# Patient Record
Sex: FEMALE | Race: ASIAN
[De-identification: names, ages, dates, MRNs, and addresses within clinical notes are randomized per-mention and may not be internally consistent; named-entity substitution may affect disease eponyms.]

---

## 2019-07-09 ENCOUNTER — HOSPITAL ENCOUNTER (EMERGENCY)
Dept: HOSPITAL 95 - ER | Age: 34
Discharge: HOME | End: 2019-07-09
Payer: COMMERCIAL

## 2019-07-09 VITALS — WEIGHT: 103.99 LBS | BODY MASS INDEX: 17.75 KG/M2 | HEIGHT: 64 IN

## 2019-07-09 DIAGNOSIS — D61.818: Primary | ICD-10-CM

## 2019-07-09 DIAGNOSIS — Z91.048: ICD-10-CM

## 2019-07-09 DIAGNOSIS — Z79.899: ICD-10-CM

## 2019-07-09 DIAGNOSIS — Z88.8: ICD-10-CM

## 2019-07-09 DIAGNOSIS — C95.90: ICD-10-CM

## 2019-07-09 LAB
ALBUMIN SERPL BCP-MCNC: 3.8 G/DL (ref 3.4–5)
ALBUMIN/GLOB SERPL: 1 {RATIO} (ref 0.8–1.8)
ALT SERPL W P-5'-P-CCNC: 27 U/L (ref 12–78)
ANION GAP SERPL CALCULATED.4IONS-SCNC: 5 MMOL/L (ref 6–16)
AST SERPL W P-5'-P-CCNC: 16 U/L (ref 12–37)
BILIRUB SERPL-MCNC: 0.3 MG/DL (ref 0.1–1)
BUN SERPL-MCNC: 15 MG/DL (ref 8–24)
CALCIUM SERPL-MCNC: 9.6 MG/DL (ref 8.5–10.1)
CHLORIDE SERPL-SCNC: 106 MMOL/L (ref 98–108)
CO2 SERPL-SCNC: 33 MMOL/L (ref 21–32)
CREAT SERPL-MCNC: 0.65 MG/DL (ref 0.4–1)
FIBRINOGEN PPP-MCNC: 396 MG/DL (ref 170–430)
GLOBULIN SER CALC-MCNC: 3.7 G/DL (ref 2.2–4)
GLUCOSE SERPL-MCNC: 101 MG/DL (ref 70–99)
POTASSIUM SERPL-SCNC: 3.9 MMOL/L (ref 3.5–5.5)
PROT SERPL-MCNC: 7.5 G/DL (ref 6.4–8.2)
PROTHROMBIN TIME: 10.2 SEC (ref 9.7–11.5)
SODIUM SERPL-SCNC: 144 MMOL/L (ref 136–145)

## 2019-07-09 PROCEDURE — P9035 PLATELET PHERES LEUKOREDUCED: HCPCS

## 2019-07-12 ENCOUNTER — HOSPITAL ENCOUNTER (EMERGENCY)
Dept: HOSPITAL 95 - ER | Age: 34
Discharge: HOME | End: 2019-07-12
Payer: COMMERCIAL

## 2019-07-12 VITALS — BODY MASS INDEX: 17.52 KG/M2 | HEIGHT: 64 IN | WEIGHT: 102.6 LBS

## 2019-07-12 DIAGNOSIS — G89.29: Primary | ICD-10-CM

## 2019-07-12 DIAGNOSIS — Z79.899: ICD-10-CM

## 2019-07-12 DIAGNOSIS — M54.9: ICD-10-CM

## 2019-07-12 DIAGNOSIS — C95.90: ICD-10-CM

## 2019-07-12 DIAGNOSIS — Z88.8: ICD-10-CM

## 2019-07-15 ENCOUNTER — HOSPITAL ENCOUNTER (OUTPATIENT)
Dept: HOSPITAL 95 - ATC | Age: 34
Discharge: HOME | End: 2019-07-15
Attending: INTERNAL MEDICINE
Payer: COMMERCIAL

## 2019-07-15 DIAGNOSIS — C92.02: Primary | ICD-10-CM

## 2019-07-15 LAB
BASOPHILS # BLD: 0 K/MM3 (ref 0–0.23)
BASOPHILS NFR BLD: 0 % (ref 0–2)
BLASTS NFR BLD MANUAL: 22 % (ref 0–0)
DEPRECATED RDW RBC AUTO: 51.7 FL (ref 35.1–46.3)
EOSINOPHIL # BLD: 0 K/MM3 (ref 0–0.68)
EOSINOPHIL NFR BLD: 0 % (ref 0–6)
ERYTHROCYTE [DISTWIDTH] IN BLOOD BY AUTOMATED COUNT: 14.7 % (ref 11.7–14.2)
HCT VFR BLD AUTO: 28.2 % (ref 33–51)
HGB BLD-MCNC: 9.1 G/DL (ref 11.5–16)
LYMPHOCYTES # BLD: 4.06 K/MM3 (ref 0.84–5.2)
LYMPHOCYTES NFR BLD: 70 % (ref 21–46)
MCHC RBC AUTO-ENTMCNC: 32.3 G/DL (ref 31.5–36.5)
MCV RBC AUTO: 97 FL (ref 80–100)
MONOCYTES # BLD: 0.11 K/MM3 (ref 0.16–1.47)
MONOCYTES NFR BLD: 2 % (ref 4–13)
NEUTS SEG # BLD MANUAL: 0.34 K/MM3 (ref 1.96–9.15)
NEUTS SEG NFR BLD MANUAL: 6 % (ref 41–73)
NRBC # BLD AUTO: 0 K/MM3 (ref 0–0.02)
NRBC BLD AUTO-RTO: 0 /100 WBC (ref 0–0.2)
PLATELET # BLD AUTO: 6 K/MM3 (ref 150–400)
TOTAL CELLS COUNTED BLD: 100

## 2019-07-15 PROCEDURE — P9035 PLATELET PHERES LEUKOREDUCED: HCPCS

## 2019-07-15 PROCEDURE — 30233R1 TRANSFUSION OF NONAUTOLOGOUS PLATELETS INTO PERIPHERAL VEIN, PERCUTANEOUS APPROACH: ICD-10-PCS | Performed by: INTERNAL MEDICINE

## 2019-08-01 ENCOUNTER — HOSPITAL ENCOUNTER (INPATIENT)
Dept: HOSPITAL 95 - ER | Age: 34
LOS: 3 days | Discharge: HOME | DRG: 809 | End: 2019-08-04
Attending: INTERNAL MEDICINE | Admitting: INTERNAL MEDICINE
Payer: COMMERCIAL

## 2019-08-01 VITALS — HEIGHT: 64.02 IN | BODY MASS INDEX: 18.22 KG/M2 | WEIGHT: 106.7 LBS
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
## 2019-08-01 DIAGNOSIS — R50.81: ICD-10-CM

## 2019-08-01 DIAGNOSIS — Z94.81: ICD-10-CM

## 2019-08-01 DIAGNOSIS — D70.1: Primary | ICD-10-CM

## 2019-08-01 DIAGNOSIS — Z94.84: ICD-10-CM

## 2019-08-01 DIAGNOSIS — D69.6: ICD-10-CM

## 2019-08-01 DIAGNOSIS — T45.1X5A: ICD-10-CM

## 2019-08-01 DIAGNOSIS — K21.9: ICD-10-CM

## 2019-08-01 DIAGNOSIS — C95.92: ICD-10-CM

## 2019-08-01 LAB
ALBUMIN SERPL BCP-MCNC: 2.9 G/DL (ref 3.4–5)
ALBUMIN/GLOB SERPL: 0.9 {RATIO} (ref 0.8–1.8)
ALT SERPL W P-5'-P-CCNC: 24 U/L (ref 12–78)
ANION GAP SERPL CALCULATED.4IONS-SCNC: 7 MMOL/L (ref 6–16)
AST SERPL W P-5'-P-CCNC: 10 U/L (ref 12–37)
BASOPHILS # BLD AUTO: 0 K/MM3 (ref 0–0.23)
BASOPHILS NFR BLD AUTO: 0 % (ref 0–2)
BILIRUB SERPL-MCNC: 0.5 MG/DL (ref 0.1–1)
BUN SERPL-MCNC: 11 MG/DL (ref 8–24)
CALCIUM SERPL-MCNC: 8.7 MG/DL (ref 8.5–10.1)
CHLORIDE SERPL-SCNC: 104 MMOL/L (ref 98–108)
CO2 SERPL-SCNC: 29 MMOL/L (ref 21–32)
CREAT SERPL-MCNC: 0.64 MG/DL (ref 0.4–1)
DEPRECATED RDW RBC AUTO: 47.8 FL (ref 35.1–46.3)
EOSINOPHIL # BLD AUTO: 0 K/MM3 (ref 0–0.68)
EOSINOPHIL NFR BLD AUTO: 0 % (ref 0–6)
ERYTHROCYTE [DISTWIDTH] IN BLOOD BY AUTOMATED COUNT: 14.9 % (ref 11.7–14.2)
GLOBULIN SER CALC-MCNC: 3.4 G/DL (ref 2.2–4)
GLUCOSE SERPL-MCNC: 161 MG/DL (ref 70–99)
HCT VFR BLD AUTO: 23.1 % (ref 33–51)
HGB BLD-MCNC: 7.8 G/DL (ref 11.5–16)
IMM GRANULOCYTES # BLD AUTO: 0.01 K/MM3 (ref 0–0.1)
IMM GRANULOCYTES NFR BLD AUTO: 6 % (ref 0–1)
LYMPHOCYTES # BLD AUTO: 0.07 K/MM3 (ref 0.84–5.2)
LYMPHOCYTES NFR BLD AUTO: 44 % (ref 21–46)
MCHC RBC AUTO-ENTMCNC: 33.8 G/DL (ref 31.5–36.5)
MCV RBC AUTO: 88 FL (ref 80–100)
MONOCYTES # BLD AUTO: 0.01 K/MM3 (ref 0.16–1.47)
MONOCYTES NFR BLD AUTO: 6 % (ref 4–13)
NEUTROPHILS # BLD AUTO: 0.07 K/MM3 (ref 1.96–9.15)
NEUTROPHILS NFR BLD AUTO: 44 % (ref 41–73)
NRBC # BLD AUTO: 0 K/MM3 (ref 0–0.02)
NRBC BLD AUTO-RTO: 0 /100 WBC (ref 0–0.2)
PLATELET # BLD AUTO: 31 K/MM3 (ref 150–400)
POTASSIUM SERPL-SCNC: 3.5 MMOL/L (ref 3.5–5.5)
PROT SERPL-MCNC: 6.3 G/DL (ref 6.4–8.2)
PROTHROMBIN TIME: 11 SEC (ref 9.7–11.5)
RBC #/AREA URNS HPF: (no result) /HPF (ref 0–2)
SODIUM SERPL-SCNC: 140 MMOL/L (ref 136–145)
SP GR SPEC: 1.01 (ref 1–1.02)
UROBILINOGEN UR STRIP-MCNC: (no result) MG/DL
WBC #/AREA URNS HPF: (no result) /HPF (ref 0–5)

## 2019-08-01 PROCEDURE — P9035 PLATELET PHERES LEUKOREDUCED: HCPCS

## 2019-08-01 PROCEDURE — P9612 CATHETERIZE FOR URINE SPEC: HCPCS

## 2019-08-01 PROCEDURE — P9040 RBC LEUKOREDUCED IRRADIATED: HCPCS

## 2019-08-01 PROCEDURE — P9016 RBC LEUKOCYTES REDUCED: HCPCS

## 2019-08-01 PROCEDURE — A9270 NON-COVERED ITEM OR SERVICE: HCPCS

## 2019-08-01 PROCEDURE — C9113 INJ PANTOPRAZOLE SODIUM, VIA: HCPCS

## 2019-08-01 NOTE — NUR
ADMIT TO FLOOR
ARRIVED TO FLOOR VIA STRETCHER ACCOMPANIED BY SPOUSE.
ORIENTED TO ROOM. CALL LIGHT WITHIN REACH. BED LOW AND IN LOCKED POSITION.

## 2019-08-01 NOTE — NUR
DISCHARGE NOTE
NEW ADMIT THIS P.M. FROM ED. CHEMO 2 DAYS AGO. ORIGINAL DX LEUKEMIA IN 2016 IN
[place]. M[place] STATES TO OBTAIN TREATMENT. NEUTROPENIC PRECAUTIONS. EATING
AND DRINKING WELL. OX4 AND INDEPENDENT IN ROOM. SCHEDULED FOR LABS AND
POSSIBLE TRANSFUSIONS TOMORROW WITH DR. ELLIS. I CALLED DR. RODRIGUEZ OFFICE AND
LEFT VM THAT PATIENT WAS AT [facility]. DENIES ANY PAIN. AWAITING CLINICAL TRIAL AT
[place] IN FIRST PART OF THIS MONTH.

## 2019-08-02 LAB
ANION GAP SERPL CALCULATED.4IONS-SCNC: 3 MMOL/L (ref 6–16)
BASOPHILS # BLD AUTO: 0 K/MM3 (ref 0–0.23)
BASOPHILS NFR BLD AUTO: 0 % (ref 0–2)
BUN SERPL-MCNC: 9 MG/DL (ref 8–24)
CALCIUM SERPL-MCNC: 8 MG/DL (ref 8.5–10.1)
CHLORIDE SERPL-SCNC: 108 MMOL/L (ref 98–108)
CO2 SERPL-SCNC: 28 MMOL/L (ref 21–32)
CREAT SERPL-MCNC: 0.65 MG/DL (ref 0.4–1)
DEPRECATED RDW RBC AUTO: 49.1 FL (ref 35.1–46.3)
EOSINOPHIL # BLD AUTO: 0 K/MM3 (ref 0–0.68)
EOSINOPHIL NFR BLD AUTO: 0 % (ref 0–6)
ERYTHROCYTE [DISTWIDTH] IN BLOOD BY AUTOMATED COUNT: 14.9 % (ref 11.7–14.2)
GLUCOSE SERPL-MCNC: 100 MG/DL (ref 70–99)
HCT VFR BLD AUTO: 19 % (ref 33–51)
HGB BLD-MCNC: 6.3 G/DL (ref 11.5–16)
IMM GRANULOCYTES # BLD AUTO: 0 K/MM3 (ref 0–0.1)
IMM GRANULOCYTES NFR BLD AUTO: 0 % (ref 0–1)
LYMPHOCYTES # BLD AUTO: 0.1 K/MM3 (ref 0.84–5.2)
LYMPHOCYTES NFR BLD AUTO: 44 % (ref 21–46)
MCHC RBC AUTO-ENTMCNC: 33.2 G/DL (ref 31.5–36.5)
MCV RBC AUTO: 90 FL (ref 80–100)
MONOCYTES # BLD AUTO: 0.02 K/MM3 (ref 0.16–1.47)
MONOCYTES NFR BLD AUTO: 9 % (ref 4–13)
NEUTROPHILS # BLD AUTO: 0.11 K/MM3 (ref 1.96–9.15)
NEUTROPHILS NFR BLD AUTO: 48 % (ref 41–73)
NRBC # BLD AUTO: 0 K/MM3 (ref 0–0.02)
NRBC BLD AUTO-RTO: 0 /100 WBC (ref 0–0.2)
PLATELET # BLD AUTO: 19 K/MM3 (ref 150–400)
POTASSIUM SERPL-SCNC: 3.6 MMOL/L (ref 3.5–5.5)
SODIUM SERPL-SCNC: 139 MMOL/L (ref 136–145)

## 2019-08-02 NOTE — NUR
PT QUITE PLEASANT COOP A/O. DENIES PAIN AT THIS TIME. H/R REG, NO MURMER
NOTED. NO TELE. LUNGS CLEAR, RESP EASY, UNLABORED. ON R.A.. BT X4 ALST BM
YEST. STATES NORMAL. VOIDS PER BATHROOM. INDEPENDANT IN ROOM.BED IN LOW
POSITION, CALL LITE IN REACH, CALLS APPROP

## 2019-08-02 NOTE — NUR
SHIFT SUMMARY
PT A/O INDEPENDENT NO C/O PAIN. ORAL TEMP 103.0 MEDICATED C TYLENOL. HAD BEEN
BUNDLED IN BLANKETS AND HEAT TURNED UP HIGH DUE TO C/O BEING COLD. UNCOVERED
FROM BLANKETS AND TOOK OFF JACKET AND DRANK SOME ICE WATER. RECHECKED ORAL
TEMP 99.8. SAID SHE WASN'T ABLE TO GET A LOT OF SLEEP T/O NIGHT. CALL LIGHT IN
REACH.

## 2019-08-02 NOTE — NUR
PT PLEASANT TODAY. HUSB IN ROOM. QUITE PLEASANT ALSO. 1 U PRBC ADMIN. NO OTHER
CONCERNSAT THIS TIME. INDEPENDANT IN ROOM. BED IN LOW POSITION, CALL LITE IN
REACH, CALLS APPROP

## 2019-08-03 LAB
ANION GAP SERPL CALCULATED.4IONS-SCNC: 6 MMOL/L (ref 6–16)
BUN SERPL-MCNC: 10 MG/DL (ref 8–24)
CALCIUM SERPL-MCNC: 8.2 MG/DL (ref 8.5–10.1)
CHLORIDE SERPL-SCNC: 109 MMOL/L (ref 98–108)
CO2 SERPL-SCNC: 27 MMOL/L (ref 21–32)
CREAT SERPL-MCNC: 0.61 MG/DL (ref 0.4–1)
DEPRECATED RDW RBC AUTO: 51.5 FL (ref 35.1–46.3)
ERYTHROCYTE [DISTWIDTH] IN BLOOD BY AUTOMATED COUNT: 15.9 % (ref 11.7–14.2)
FIBRINOGEN PPP-MCNC: 499 MG/DL (ref 170–430)
GLUCOSE SERPL-MCNC: 107 MG/DL (ref 70–99)
HCT VFR BLD AUTO: 22.3 % (ref 33–51)
HGB BLD-MCNC: 7.5 G/DL (ref 11.5–16)
MCHC RBC AUTO-ENTMCNC: 33.6 G/DL (ref 31.5–36.5)
MCV RBC AUTO: 89 FL (ref 80–100)
NRBC # BLD AUTO: 0 K/MM3 (ref 0–0.02)
NRBC BLD AUTO-RTO: 0 /100 WBC (ref 0–0.2)
PLATELET # BLD AUTO: 10 K/MM3 (ref 150–400)
POTASSIUM SERPL-SCNC: 3.5 MMOL/L (ref 3.5–5.5)
PROTHROMBIN TIME: 10.8 SEC (ref 9.7–11.5)
SODIUM SERPL-SCNC: 142 MMOL/L (ref 136–145)

## 2019-08-03 PROCEDURE — 30233N1 TRANSFUSION OF NONAUTOLOGOUS RED BLOOD CELLS INTO PERIPHERAL VEIN, PERCUTANEOUS APPROACH: ICD-10-PCS | Performed by: INTERNAL MEDICINE

## 2019-08-03 PROCEDURE — 30233R1 TRANSFUSION OF NONAUTOLOGOUS PLATELETS INTO PERIPHERAL VEIN, PERCUTANEOUS APPROACH: ICD-10-PCS | Performed by: INTERNAL MEDICINE

## 2019-08-03 NOTE — NUR
PT PLEASANT COOP A/O TALKATIVE. INDEPENDANT IN ROOM. H/R REG, NO MURMER NOTED.
NO TELE. LUNGS CLEAR, RESP EASY,UNLABORED. ON R/A. BT X4 LAST BM YEST. VOIDS
INDEPENDANT IN ROOM. BED IN LOW POSITION, CALL LITE IN REACH, CALLS APPROP

## 2019-08-03 NOTE — NUR
PT RECEIVED ONE UNIT PLATELETTS TODAY. IS RUNNING ONE OF TWO UNITS OF PRBC.
WILL PASS TO NITE RN FOR LAST UNIT. PT PLEASANT TODAY, AS IS HER FAMILY. HAS
BEEN UP TO WALK HALLS THIS AFT, TEMPS HAVE BEEN STABLE TODAY UNTIL JUST
RECENTLY, THEY BEGAN TO CREEP UP. WILL GIVE TYLENOL. PT SECOND PORT OF IV
APPEARS TO BE BLOCKED. TRIED TO FREE WITH NEW CAP AND ALSO HAD PICC LINE RN
CHECK, NO IMPROVEMENT. NO OTHER CONCERNS AT THIS TIME. [facility],
CALL LITE IN REACH, CALLS APPROP

## 2019-08-03 NOTE — NUR
SHIFT SUMMARY:  PT IS ALERT AND ORIENTED.  PT IS CALM AND COOPERATIVE WITH
CARE.  PT CALLS APPROPRIATELY.  PT IS INDEPENDENT IN THE ROOM.  PT DENIES
PAIN, NAUSEA, VOMITING, AND SOB.  CRITICALY LOW PLATELET LEVELS, DROPPED
OVERNIGHT, CALL TO DR. JUNIOR PENDING.  PT SLEPT SOUNDLY THROUGHOUT THE
NIGHT WHEN NOT DISTURBED.  BED IN LOW POSITION, CALL LIGHT WITHIN REACH.  WILL
REPOR TO DAY NURSE.

## 2019-08-04 LAB
ALBUMIN SERPL BCP-MCNC: 2.6 G/DL (ref 3.4–5)
ALBUMIN/GLOB SERPL: 0.8 {RATIO} (ref 0.8–1.8)
ALT SERPL W P-5'-P-CCNC: 15 U/L (ref 12–78)
ANION GAP SERPL CALCULATED.4IONS-SCNC: 5 MMOL/L (ref 6–16)
AST SERPL W P-5'-P-CCNC: 7 U/L (ref 12–37)
BASOPHILS # BLD AUTO: 0 K/MM3 (ref 0–0.23)
BASOPHILS # BLD: 0 K/MM3 (ref 0–0.23)
BASOPHILS NFR BLD AUTO: 0 % (ref 0–2)
BASOPHILS NFR BLD: 0 % (ref 0–2)
BILIRUB SERPL-MCNC: 0.8 MG/DL (ref 0.1–1)
BUN SERPL-MCNC: 16 MG/DL (ref 8–24)
CALCIUM SERPL-MCNC: 8.2 MG/DL (ref 8.5–10.1)
CHLORIDE SERPL-SCNC: 111 MMOL/L (ref 98–108)
CO2 SERPL-SCNC: 28 MMOL/L (ref 21–32)
CREAT SERPL-MCNC: 0.57 MG/DL (ref 0.4–1)
DEPRECATED RDW RBC AUTO: 49.7 FL (ref 35.1–46.3)
EOSINOPHIL # BLD AUTO: 0 K/MM3 (ref 0–0.68)
EOSINOPHIL # BLD: 0 K/MM3 (ref 0–0.68)
EOSINOPHIL NFR BLD AUTO: 0 % (ref 0–6)
EOSINOPHIL NFR BLD: 0 % (ref 0–6)
ERYTHROCYTE [DISTWIDTH] IN BLOOD BY AUTOMATED COUNT: 15.4 % (ref 11.7–14.2)
GLOBULIN SER CALC-MCNC: 3.3 G/DL (ref 2.2–4)
GLUCOSE SERPL-MCNC: 106 MG/DL (ref 70–99)
HCT VFR BLD AUTO: 30.8 % (ref 33–51)
HGB BLD-MCNC: 10.3 G/DL (ref 11.5–16)
IMM GRANULOCYTES # BLD AUTO: 0.03 K/MM3 (ref 0–0.1)
IMM GRANULOCYTES NFR BLD AUTO: 10 % (ref 0–1)
LYMPHOCYTES # BLD AUTO: 0.09 K/MM3 (ref 0.84–5.2)
LYMPHOCYTES # BLD: 0.08 K/MM3 (ref 0.84–5.2)
LYMPHOCYTES NFR BLD AUTO: 29 % (ref 21–46)
LYMPHOCYTES NFR BLD: 29 % (ref 21–46)
MCHC RBC AUTO-ENTMCNC: 33.4 G/DL (ref 31.5–36.5)
MCV RBC AUTO: 88 FL (ref 80–100)
METAMYELOCYTES # BLD MANUAL: 0 K/MM3 (ref 0–0)
METAMYELOCYTES NFR BLD MANUAL: 2 % (ref 0–0)
MONOCYTES # BLD AUTO: 0.02 K/MM3 (ref 0.16–1.47)
MONOCYTES # BLD: 0 K/MM3 (ref 0.16–1.47)
MONOCYTES NFR BLD AUTO: 7 % (ref 4–13)
MONOCYTES NFR BLD: 2 % (ref 4–13)
NEUTROPHILS # BLD AUTO: 0.17 K/MM3 (ref 1.96–9.15)
NEUTROPHILS NFR BLD AUTO: 55 % (ref 41–73)
NEUTS BAND NFR BLD MANUAL: 7 % (ref 0–8)
NEUTS SEG # BLD MANUAL: 0.19 K/MM3 (ref 1.96–9.15)
NEUTS SEG NFR BLD MANUAL: 57 % (ref 41–73)
NRBC # BLD AUTO: 0 K/MM3 (ref 0–0.02)
NRBC BLD AUTO-RTO: 0 /100 WBC (ref 0–0.2)
PLATELET # BLD AUTO: 29 K/MM3 (ref 150–400)
POTASSIUM SERPL-SCNC: 3.7 MMOL/L (ref 3.5–5.5)
PROMYELOCYTES # BLD MANUAL: 0 K/MM3 (ref 0–0)
PROMYELOCYTES NFR BLD MANUAL: 1 % (ref 0–0)
PROT SERPL-MCNC: 5.9 G/DL (ref 6.4–8.2)
SODIUM SERPL-SCNC: 144 MMOL/L (ref 136–145)
TOTAL CELLS COUNTED BLD: 82

## 2019-08-04 NOTE — NUR
SHIFT SUMMARY: 33 Y/O SLENDER FEMALE RESTED COMFORTABLY ALL SHIFT AFTER
RECEIVING ONE UNIT PRBC AT BEGINNING OF SHIFT, SPOUSE AND TODDLER DAUGHTER AT
SIDE BEGINNING SHIFT AND VERY SUPPORTIVE, DENIES PAIN OR NAUSEA, HAPPY AND
COOPERATIVE, ABLE AMBULATE BATHROOM AND BACK WITH GAIT SLOW AND STEADY,
NEUTROPENIC PRECAUTIONS INTACT, BED LOW POSITION, CALL LIGHT AT SIDE.

## 2019-08-04 NOTE — NUR
DISCHARGE NOTE
IV DC'D WNL. PICC LINE LEFT IN PLACE AS PT UNDERGOING LONGTERM IV THERAPY
OUTPATIENT FACILITY. DISCHARGE MEDICATIONS FAXED TO PREFERED PHARMACY.
PERSONAL POSSESSIONS GATHERED. PT DRESSED SELF IN PERSONAL CLOTHING. PT
PROVIDED WITH HARDCOPY AND VERBAL INSTRUCTIONS FOR DISCHARGE RE: DIAGNOSES,
MEDICATIONS, AND FOLLOW UP APPOINTMENTS. PT & FAMILY HAD NO FURTHER QUESTIONS.
PT LEFT THE FLOOR ACCOMPANIED BY FAMILY.

## 2019-08-16 ENCOUNTER — HOSPITAL ENCOUNTER (OUTPATIENT)
Dept: HOSPITAL 95 - ATC | Age: 34
Discharge: HOME | End: 2019-08-16
Attending: INTERNAL MEDICINE
Payer: COMMERCIAL

## 2019-08-16 DIAGNOSIS — C91.02: ICD-10-CM

## 2019-08-16 DIAGNOSIS — C92.02: Primary | ICD-10-CM

## 2019-08-16 DIAGNOSIS — D61.818: ICD-10-CM

## 2019-08-16 LAB
BASOPHILS # BLD AUTO: 0 K/MM3 (ref 0–0.23)
BASOPHILS NFR BLD AUTO: 0 % (ref 0–2)
DEPRECATED RDW RBC AUTO: 47.4 FL (ref 35.1–46.3)
EOSINOPHIL # BLD AUTO: 0.01 K/MM3 (ref 0–0.68)
EOSINOPHIL NFR BLD AUTO: 1 % (ref 0–6)
ERYTHROCYTE [DISTWIDTH] IN BLOOD BY AUTOMATED COUNT: 14 % (ref 11.7–14.2)
HCT VFR BLD AUTO: 27.3 % (ref 33–51)
HGB BLD-MCNC: 8.6 G/DL (ref 11.5–16)
IMM GRANULOCYTES # BLD AUTO: 0.05 K/MM3 (ref 0–0.1)
IMM GRANULOCYTES NFR BLD AUTO: 4 % (ref 0–1)
LYMPHOCYTES # BLD AUTO: 0.2 K/MM3 (ref 0.84–5.2)
LYMPHOCYTES NFR BLD AUTO: 16 % (ref 21–46)
MCHC RBC AUTO-ENTMCNC: 31.5 G/DL (ref 31.5–36.5)
MCV RBC AUTO: 92 FL (ref 80–100)
MONOCYTES # BLD AUTO: 0.08 K/MM3 (ref 0.16–1.47)
MONOCYTES NFR BLD AUTO: 6 % (ref 4–13)
NEUTROPHILS # BLD AUTO: 0.94 K/MM3 (ref 1.96–9.15)
NEUTROPHILS NFR BLD AUTO: 73 % (ref 41–73)
NRBC # BLD AUTO: 0 K/MM3 (ref 0–0.02)
NRBC BLD AUTO-RTO: 0 /100 WBC (ref 0–0.2)
PLATELET # BLD AUTO: 7 K/MM3 (ref 150–400)

## 2019-08-16 PROCEDURE — P9035 PLATELET PHERES LEUKOREDUCED: HCPCS

## 2019-08-20 ENCOUNTER — HOSPITAL ENCOUNTER (OUTPATIENT)
Dept: HOSPITAL 95 - ATC | Age: 34
Discharge: HOME | End: 2019-08-20
Attending: INTERNAL MEDICINE
Payer: COMMERCIAL

## 2019-08-20 DIAGNOSIS — Z91.048: ICD-10-CM

## 2019-08-20 DIAGNOSIS — Z79.899: ICD-10-CM

## 2019-08-20 DIAGNOSIS — C92.02: Primary | ICD-10-CM

## 2019-09-30 LAB
BASOPHILS # BLD: 0 K/MM3
BASOPHILS NFR BLD: 0 %
DEPRECATED RDW RBC AUTO: 42 FL
EOSINOPHIL # BLD: 0.02 K/MM3
EOSINOPHIL NFR BLD: 4 %
ERYTHROCYTE [DISTWIDTH] IN BLOOD BY AUTOMATED COUNT: 13.1 %
HCT VFR BLD AUTO: 21.8 %
HGB BLD-MCNC: 7.2 G/DL
LYMPHOCYTES # BLD: 0.38 K/MM3
LYMPHOCYTES NFR BLD: 68 %
MCHC RBC AUTO-ENTMCNC: 33 G/DL
MCV RBC AUTO: 89 FL
MONOCYTES # BLD: 0.05 K/MM3
MONOCYTES NFR BLD: 10 %
NEUTS BAND NFR BLD MANUAL: 2 %
NEUTS SEG # BLD MANUAL: 0.1 K/MM3
NEUTS SEG NFR BLD MANUAL: 16 %
NRBC # BLD AUTO: 0 K/MM3
NRBC BLD AUTO-RTO: 0 /100 WBC
PLATELET # BLD AUTO: 7 K/MM3
TOTAL CELLS COUNTED BLD: 50

## 2019-10-01 ENCOUNTER — HOSPITAL ENCOUNTER (OUTPATIENT)
Dept: HOSPITAL 95 - ATC | Age: 34
Discharge: HOME | End: 2019-10-01
Attending: INTERNAL MEDICINE
Payer: COMMERCIAL

## 2019-10-01 DIAGNOSIS — D61.818: ICD-10-CM

## 2019-10-01 DIAGNOSIS — Z88.8: ICD-10-CM

## 2019-10-01 DIAGNOSIS — C91.02: Primary | ICD-10-CM

## 2019-10-01 DIAGNOSIS — Z79.899: ICD-10-CM

## 2019-10-01 PROCEDURE — P9035 PLATELET PHERES LEUKOREDUCED: HCPCS

## 2019-10-01 PROCEDURE — P9016 RBC LEUKOCYTES REDUCED: HCPCS

## 2019-10-15 ENCOUNTER — HOSPITAL ENCOUNTER (OUTPATIENT)
Dept: HOSPITAL 95 - ATC | Age: 34
Discharge: HOME | End: 2019-10-15
Attending: INTERNAL MEDICINE
Payer: COMMERCIAL

## 2019-10-15 DIAGNOSIS — Z91.048: ICD-10-CM

## 2019-10-15 DIAGNOSIS — C92.02: ICD-10-CM

## 2019-10-15 DIAGNOSIS — Z45.2: Primary | ICD-10-CM

## 2019-10-15 DIAGNOSIS — Z79.2: ICD-10-CM

## 2019-10-15 DIAGNOSIS — Z79.899: ICD-10-CM

## 2019-10-16 ENCOUNTER — HOSPITAL ENCOUNTER (EMERGENCY)
Dept: HOSPITAL 95 - ER | Age: 34
Discharge: HOME | End: 2019-10-16
Payer: COMMERCIAL

## 2019-10-16 VITALS — WEIGHT: 105.82 LBS | HEIGHT: 60 IN | BODY MASS INDEX: 20.78 KG/M2

## 2019-10-16 DIAGNOSIS — Z91.048: ICD-10-CM

## 2019-10-16 DIAGNOSIS — Z79.899: ICD-10-CM

## 2019-10-16 DIAGNOSIS — E86.0: ICD-10-CM

## 2019-10-16 DIAGNOSIS — N39.0: Primary | ICD-10-CM

## 2019-10-16 LAB
ALBUMIN SERPL BCP-MCNC: 4.2 G/DL (ref 3.4–5)
ALBUMIN/GLOB SERPL: 2 {RATIO} (ref 0.8–1.8)
ALT SERPL W P-5'-P-CCNC: 25 U/L (ref 12–78)
ANION GAP SERPL CALCULATED.4IONS-SCNC: 6 MMOL/L (ref 6–16)
AST SERPL W P-5'-P-CCNC: 19 U/L (ref 12–37)
BASOPHILS # BLD AUTO: 0.01 K/MM3 (ref 0–0.23)
BASOPHILS NFR BLD AUTO: 0 % (ref 0–2)
BILIRUB SERPL-MCNC: 0.6 MG/DL (ref 0.1–1)
BUN SERPL-MCNC: 18 MG/DL (ref 8–24)
CA-I BLD-SCNC: 1.24 MMOL/L (ref 1.1–1.46)
CALCIUM SERPL-MCNC: 9.6 MG/DL (ref 8.5–10.1)
CHLORIDE BLD-SCNC: 103 MMOL/L (ref 98–108)
CHLORIDE SERPL-SCNC: 107 MMOL/L (ref 98–108)
CO2 BLD-SCNC: 28 MMOL/L (ref 21–32)
CO2 SERPL-SCNC: 30 MMOL/L (ref 21–32)
CREAT BLD-MCNC: 0.7 MG/DL (ref 0.6–1)
CREAT SERPL-MCNC: 0.65 MG/DL (ref 0.4–1)
DEPRECATED RDW RBC AUTO: 44.6 FL (ref 35.1–46.3)
EOSINOPHIL # BLD AUTO: 0.03 K/MM3 (ref 0–0.68)
EOSINOPHIL NFR BLD AUTO: 1 % (ref 0–6)
ERYTHROCYTE [DISTWIDTH] IN BLOOD BY AUTOMATED COUNT: 15.7 % (ref 11.7–14.2)
GLOBULIN SER CALC-MCNC: 2.1 G/DL (ref 2.2–4)
GLUCOSE BLDC GLUCOMTR-MCNC: 129 MG/DL (ref 70–99)
GLUCOSE SERPL-MCNC: 127 MG/DL (ref 70–99)
HCT VFR BLD AUTO: 27.3 % (ref 33–51)
HCT VFR BLD CALC: 24 % (ref 36–46)
HGB BLD CALC-MCNC: 8.2 G/DL (ref 12–16)
HGB BLD-MCNC: 8.9 G/DL (ref 11.5–16)
IMM GRANULOCYTES # BLD AUTO: 0 K/MM3 (ref 0–0.1)
IMM GRANULOCYTES NFR BLD AUTO: 0 % (ref 0–1)
LYMPHOCYTES # BLD AUTO: 1.21 K/MM3 (ref 0.84–5.2)
LYMPHOCYTES NFR BLD AUTO: 50 % (ref 21–46)
MCHC RBC AUTO-ENTMCNC: 32.6 G/DL (ref 31.5–36.5)
MCV RBC AUTO: 94 FL (ref 80–100)
MONOCYTES # BLD AUTO: 0.29 K/MM3 (ref 0.16–1.47)
MONOCYTES NFR BLD AUTO: 12 % (ref 4–13)
NEUTROPHILS # BLD AUTO: 0.9 K/MM3 (ref 1.96–9.15)
NEUTROPHILS NFR BLD AUTO: 37 % (ref 41–73)
NRBC # BLD AUTO: 0 K/MM3 (ref 0–0.02)
NRBC BLD AUTO-RTO: 0 /100 WBC (ref 0–0.2)
PLATELET # BLD AUTO: 20 K/MM3 (ref 150–400)
POTASSIUM BLD-SCNC: 3.3 MMOL/L (ref 3.5–5.5)
POTASSIUM SERPL-SCNC: 3.3 MMOL/L (ref 3.5–5.5)
PROT SERPL-MCNC: 6.3 G/DL (ref 6.4–8.2)
SODIUM BLD-SCNC: 142 MMOL/L (ref 135–148)
SODIUM SERPL-SCNC: 143 MMOL/L (ref 136–145)

## 2020-01-07 ENCOUNTER — HOSPITAL ENCOUNTER (EMERGENCY)
Dept: HOSPITAL 95 - ER | Age: 35
Discharge: HOME | End: 2020-01-07
Payer: COMMERCIAL

## 2020-01-07 VITALS — WEIGHT: 110.01 LBS | HEIGHT: 65 IN | BODY MASS INDEX: 18.33 KG/M2

## 2020-01-07 DIAGNOSIS — J32.9: ICD-10-CM

## 2020-01-07 DIAGNOSIS — Z91.048: ICD-10-CM

## 2020-01-07 DIAGNOSIS — Z79.899: ICD-10-CM

## 2020-01-07 DIAGNOSIS — R53.1: Primary | ICD-10-CM

## 2020-01-07 LAB
ALBUMIN SERPL BCP-MCNC: 4.7 G/DL (ref 3.4–5)
ALBUMIN/GLOB SERPL: 1.7 {RATIO} (ref 0.8–1.8)
ALT SERPL W P-5'-P-CCNC: 30 U/L (ref 12–78)
ANION GAP SERPL CALCULATED.4IONS-SCNC: 6 MMOL/L (ref 6–16)
AST SERPL W P-5'-P-CCNC: 25 U/L (ref 12–37)
BASOPHILS # BLD AUTO: 0.01 K/MM3 (ref 0–0.23)
BASOPHILS NFR BLD AUTO: 0 % (ref 0–2)
BILIRUB SERPL-MCNC: 0.2 MG/DL (ref 0.1–1)
BUN SERPL-MCNC: 14 MG/DL (ref 8–24)
CALCIUM SERPL-MCNC: 9.5 MG/DL (ref 8.5–10.1)
CHLORIDE SERPL-SCNC: 106 MMOL/L (ref 98–108)
CO2 SERPL-SCNC: 30 MMOL/L (ref 21–32)
CREAT SERPL-MCNC: 0.56 MG/DL (ref 0.4–1)
DEPRECATED RDW RBC AUTO: 52.1 FL (ref 35.1–46.3)
EOSINOPHIL # BLD AUTO: 0.08 K/MM3 (ref 0–0.68)
EOSINOPHIL NFR BLD AUTO: 3 % (ref 0–6)
ERYTHROCYTE [DISTWIDTH] IN BLOOD BY AUTOMATED COUNT: 13.4 % (ref 11.7–14.2)
GLOBULIN SER CALC-MCNC: 2.8 G/DL (ref 2.2–4)
GLUCOSE SERPL-MCNC: 104 MG/DL (ref 70–99)
HCT VFR BLD AUTO: 38.6 % (ref 33–51)
HGB BLD-MCNC: 12.5 G/DL (ref 11.5–16)
IMM GRANULOCYTES # BLD AUTO: 0.01 K/MM3 (ref 0–0.1)
IMM GRANULOCYTES NFR BLD AUTO: 0 % (ref 0–1)
LEUKOCYTE ESTERASE UR QL STRIP: (no result)
LYMPHOCYTES # BLD AUTO: 0.84 K/MM3 (ref 0.84–5.2)
LYMPHOCYTES NFR BLD AUTO: 35 % (ref 21–46)
MCHC RBC AUTO-ENTMCNC: 32.4 G/DL (ref 31.5–36.5)
MCV RBC AUTO: 105 FL (ref 80–100)
MONOCYTES # BLD AUTO: 0.29 K/MM3 (ref 0.16–1.47)
MONOCYTES NFR BLD AUTO: 12 % (ref 4–13)
NEUTROPHILS # BLD AUTO: 1.18 K/MM3 (ref 1.96–9.15)
NEUTROPHILS NFR BLD AUTO: 49 % (ref 41–73)
NRBC # BLD AUTO: 0 K/MM3 (ref 0–0.02)
NRBC BLD AUTO-RTO: 0 /100 WBC (ref 0–0.2)
PLATELET # BLD AUTO: 65 K/MM3 (ref 150–400)
POTASSIUM SERPL-SCNC: 4 MMOL/L (ref 3.5–5.5)
PROT SERPL-MCNC: 7.5 G/DL (ref 6.4–8.2)
RBC #/AREA URNS HPF: (no result) /HPF (ref 0–2)
SODIUM SERPL-SCNC: 142 MMOL/L (ref 136–145)
SP GR SPEC: 1.01 (ref 1–1.02)
UROBILINOGEN UR STRIP-MCNC: (no result) MG/DL
WBC #/AREA URNS HPF: (no result) /HPF (ref 0–5)

## 2020-02-20 ENCOUNTER — HOSPITAL ENCOUNTER (INPATIENT)
Dept: HOSPITAL 95 - ER | Age: 35
LOS: 2 days | Discharge: HOME | DRG: 871 | End: 2020-02-22
Attending: HOSPITALIST | Admitting: HOSPITALIST
Payer: COMMERCIAL

## 2020-02-20 VITALS — HEIGHT: 65 IN | BODY MASS INDEX: 17.74 KG/M2 | WEIGHT: 106.48 LBS

## 2020-02-20 DIAGNOSIS — D69.6: ICD-10-CM

## 2020-02-20 DIAGNOSIS — J32.4: ICD-10-CM

## 2020-02-20 DIAGNOSIS — K52.9: ICD-10-CM

## 2020-02-20 DIAGNOSIS — C83.50: ICD-10-CM

## 2020-02-20 DIAGNOSIS — K21.9: ICD-10-CM

## 2020-02-20 DIAGNOSIS — A41.9: Primary | ICD-10-CM

## 2020-02-20 DIAGNOSIS — J18.9: ICD-10-CM

## 2020-02-20 DIAGNOSIS — C91.00: ICD-10-CM

## 2020-02-20 DIAGNOSIS — E87.6: ICD-10-CM

## 2020-02-20 DIAGNOSIS — B02.9: ICD-10-CM

## 2020-02-20 DIAGNOSIS — F33.1: ICD-10-CM

## 2020-02-20 LAB
ALBUMIN SERPL BCP-MCNC: 4.2 G/DL (ref 3.4–5)
ALBUMIN/GLOB SERPL: 1.4 {RATIO} (ref 0.8–1.8)
ALT SERPL W P-5'-P-CCNC: 22 U/L (ref 12–78)
ANION GAP SERPL CALCULATED.4IONS-SCNC: 3 MMOL/L (ref 6–16)
AST SERPL W P-5'-P-CCNC: 13 U/L (ref 12–37)
BASOPHILS # BLD: 0.04 K/MM3 (ref 0–0.23)
BASOPHILS NFR BLD: 1 % (ref 0–2)
BILIRUB SERPL-MCNC: 0.5 MG/DL (ref 0.1–1)
BUN SERPL-MCNC: 8 MG/DL (ref 8–24)
CALCIUM SERPL-MCNC: 9.3 MG/DL (ref 8.5–10.1)
CHLORIDE SERPL-SCNC: 102 MMOL/L (ref 98–108)
CO2 SERPL-SCNC: 31 MMOL/L (ref 21–32)
CREAT SERPL-MCNC: 0.52 MG/DL (ref 0.4–1)
DEPRECATED RDW RBC AUTO: 51.8 FL (ref 35.1–46.3)
EOSINOPHIL # BLD: 0 K/MM3 (ref 0–0.68)
EOSINOPHIL NFR BLD: 0 % (ref 0–6)
ERYTHROCYTE [DISTWIDTH] IN BLOOD BY AUTOMATED COUNT: 13.8 % (ref 11.7–14.2)
GLOBULIN SER CALC-MCNC: 3.1 G/DL (ref 2.2–4)
GLUCOSE SERPL-MCNC: 129 MG/DL (ref 70–99)
HCT VFR BLD AUTO: 35.9 % (ref 33–51)
HGB BLD-MCNC: 11.7 G/DL (ref 11.5–16)
LYMPHOCYTES # BLD: 0.23 K/MM3 (ref 0.84–5.2)
LYMPHOCYTES NFR BLD: 5 % (ref 21–46)
MCHC RBC AUTO-ENTMCNC: 32.6 G/DL (ref 31.5–36.5)
MCV RBC AUTO: 103 FL (ref 80–100)
MONOCYTES # BLD: 0.09 K/MM3 (ref 0.16–1.47)
MONOCYTES NFR BLD: 2 % (ref 4–13)
NEUTS BAND NFR BLD MANUAL: 12 % (ref 0–8)
NEUTS SEG # BLD MANUAL: 4.23 K/MM3 (ref 1.96–9.15)
NEUTS SEG NFR BLD MANUAL: 80 % (ref 41–73)
NRBC # BLD AUTO: 0 K/MM3 (ref 0–0.02)
NRBC BLD AUTO-RTO: 0 /100 WBC (ref 0–0.2)
PLATELET # BLD AUTO: 58 K/MM3 (ref 150–400)
POTASSIUM SERPL-SCNC: 3.4 MMOL/L (ref 3.5–5.5)
PROT SERPL-MCNC: 7.3 G/DL (ref 6.4–8.2)
RBC #/AREA URNS HPF: (no result) /HPF (ref 0–2)
SODIUM SERPL-SCNC: 136 MMOL/L (ref 136–145)
SP GR SPEC: 1 (ref 1–1.02)
TOTAL CELLS COUNTED BLD: 100
UROBILINOGEN UR STRIP-MCNC: (no result) MG/DL

## 2020-02-20 PROCEDURE — A9270 NON-COVERED ITEM OR SERVICE: HCPCS

## 2020-02-20 PROCEDURE — G0378 HOSPITAL OBSERVATION PER HR: HCPCS

## 2020-02-21 LAB
ALBUMIN SERPL BCP-MCNC: 2.9 G/DL (ref 3.4–5)
ALBUMIN/GLOB SERPL: 1.1 {RATIO} (ref 0.8–1.8)
ALT SERPL W P-5'-P-CCNC: 15 U/L (ref 12–78)
ANION GAP SERPL CALCULATED.4IONS-SCNC: 6 MMOL/L (ref 6–16)
AST SERPL W P-5'-P-CCNC: 11 U/L (ref 12–37)
BILIRUB SERPL-MCNC: 0.3 MG/DL (ref 0.1–1)
BUN SERPL-MCNC: 8 MG/DL (ref 8–24)
CALCIUM SERPL-MCNC: 7.9 MG/DL (ref 8.5–10.1)
CHLORIDE SERPL-SCNC: 111 MMOL/L (ref 98–108)
CO2 SERPL-SCNC: 27 MMOL/L (ref 21–32)
CREAT SERPL-MCNC: 0.49 MG/DL (ref 0.4–1)
DEPRECATED RDW RBC AUTO: 53.4 FL (ref 35.1–46.3)
ERYTHROCYTE [DISTWIDTH] IN BLOOD BY AUTOMATED COUNT: 14.2 % (ref 11.7–14.2)
GLOBULIN SER CALC-MCNC: 2.7 G/DL (ref 2.2–4)
GLUCOSE SERPL-MCNC: 99 MG/DL (ref 70–99)
HCT VFR BLD AUTO: 29.4 % (ref 33–51)
HGB BLD-MCNC: 9.5 G/DL (ref 11.5–16)
MCHC RBC AUTO-ENTMCNC: 32.3 G/DL (ref 31.5–36.5)
MCV RBC AUTO: 104 FL (ref 80–100)
NRBC # BLD AUTO: 0 K/MM3 (ref 0–0.02)
NRBC BLD AUTO-RTO: 0 /100 WBC (ref 0–0.2)
PLATELET # BLD AUTO: 48 K/MM3 (ref 150–400)
POTASSIUM SERPL-SCNC: 3.4 MMOL/L (ref 3.5–5.5)
PROT SERPL-MCNC: 5.6 G/DL (ref 6.4–8.2)
SODIUM SERPL-SCNC: 144 MMOL/L (ref 136–145)

## 2020-02-21 NOTE — NUR
ASSUMED CARE- PCU DAYSHIFT.
PATIENT ALERT AND ORIENTED X4. RESP E/U ON ROOM AIR. HR NSR IN THE 70'S.
DENIES PAIN. NO RASH NOTED (HEALING SKIN NOTED ON LEFT NECK). PATIENT
INDEPENDENT IN ROOM. CALL LIGHT W/I REACH.

## 2020-02-21 NOTE — NUR
SHIFT SUMMARY
PT SLEEPING IN ROOM COMFORTABLY AT THIS TIME. NO ACUTE CHANGES IN STATUS SINCE
ARRIVAL FROM ED. PT SLEPT WELL IN ROOM. RESP EVEN UNLABORED ON RA W/ SATS
>92%. PT DENIED ANY CP OR SOB, DENIED ANY OTHER PAIN. PT INDEPENDENT IN ROOM.
NS INFUSING IN PIV AT 100ML/HR. PT REMAINS HYPOTENSIVE, AND REPORTS HER
NORMALN SBP RUNS IN THE 90'S. PT MAP REMAINED ABOVE 60 T/O NIGHT SINCE
ARRIVAL. CALL LIGHT IN REACH, WILL GIVE BEDSIDE REPORT TO ONCOMING RN.

## 2020-02-21 NOTE — NUR
PCU DAY SHIFT SUMMARY
AO THROUGHOUT SHIFT X4. RESPIRATIONS EVEN UNLABORED W/OCCASIONAL COUGH NO
SPUTUM PRODUCTION. MAINTAINS >98% SPO2 ON RA. HR NORMAL SINUS TO SINUS TACH
90S  THROUGHOUT SHIFT WITH NO FURTHER CARDIAC EVENTS PER CARDIAC TECH.
HYPOTENSION CORRECTED THROUGHOUT SHIFT. VSS.  PATIENT IND. NO ACUTE CHANGES
THROUGHOUT SHIFT. WILL CONTINUE TO MONITOR AND GIVE REPORT TO NOC SHIFT RN.

## 2020-02-22 LAB
BASOPHILS # BLD AUTO: 0.01 K/MM3 (ref 0–0.23)
BASOPHILS NFR BLD AUTO: 0 % (ref 0–2)
DEPRECATED RDW RBC AUTO: 50.8 FL (ref 35.1–46.3)
EOSINOPHIL # BLD AUTO: 0.06 K/MM3 (ref 0–0.68)
EOSINOPHIL NFR BLD AUTO: 1 % (ref 0–6)
ERYTHROCYTE [DISTWIDTH] IN BLOOD BY AUTOMATED COUNT: 13.8 % (ref 11.7–14.2)
HCT VFR BLD AUTO: 31.8 % (ref 33–51)
HGB BLD-MCNC: 10.5 G/DL (ref 11.5–16)
IMM GRANULOCYTES # BLD AUTO: 0.05 K/MM3 (ref 0–0.1)
IMM GRANULOCYTES NFR BLD AUTO: 1 % (ref 0–1)
LYMPHOCYTES # BLD AUTO: 0.47 K/MM3 (ref 0.84–5.2)
LYMPHOCYTES NFR BLD AUTO: 10 % (ref 21–46)
MCHC RBC AUTO-ENTMCNC: 33 G/DL (ref 31.5–36.5)
MCV RBC AUTO: 100 FL (ref 80–100)
MONOCYTES # BLD AUTO: 0.36 K/MM3 (ref 0.16–1.47)
MONOCYTES NFR BLD AUTO: 8 % (ref 4–13)
NEUTROPHILS # BLD AUTO: 3.65 K/MM3 (ref 1.96–9.15)
NEUTROPHILS NFR BLD AUTO: 79 % (ref 41–73)
NRBC # BLD AUTO: 0 K/MM3 (ref 0–0.02)
NRBC BLD AUTO-RTO: 0 /100 WBC (ref 0–0.2)
PLATELET # BLD AUTO: 63 K/MM3 (ref 150–400)

## 2020-02-22 NOTE — NUR
SHIFT SUMMARY
PT RESTING IN ROOM COMFORTABLY AT THIS TIME. NO ACUTE CHANGES IN STATUS SINCE
ARRIVAL FROM ED. PT REPORTS NOT SLEEPING AT ALL LAST NIGHT. RESP EVEN
UNLABORED ON RA W/ SATS >92%. DENIED ANY SOB. PT REPORTED "UPSET STOMACH"
AFTER TAKING ORAL ABX DURING NIGHT, DENIED NAUSEA.PT GIVEN SALTNIE CRACKERS TO
HELP SETLE. PT REPORTED FEELING BETTER 30 MIN LATER. DENIED OTHER NEEDS. CALL
LIGHT IN REACH. WILL GIVE BEDSIDE REPORT TO ONCOMING RN.

## 2020-02-22 NOTE — NUR
PATIENT AND FAMILY PROVIDED DISCHARGE INFORMATION REGARDING REASONS TO RETURN
TO ED, FOLLOW-UP INSTRUCTIONS, AND INFORMATION REGARDING MEDICATIONS AND
TAKING FULL ANTIBIOTICS COURSE. PATIENT AND FAMILY VERBALIZED UNDERSTANDING OF
INSTRUCTIONS. NO SIGNS OF ACUTE DISTRESS.

## 2020-05-03 ENCOUNTER — HOSPITAL ENCOUNTER (INPATIENT)
Dept: HOSPITAL 95 - ER | Age: 35
LOS: 2 days | Discharge: HOME | DRG: 872 | End: 2020-05-05
Attending: INTERNAL MEDICINE | Admitting: HOSPITALIST
Payer: COMMERCIAL

## 2020-05-03 VITALS — BODY MASS INDEX: 19.02 KG/M2 | WEIGHT: 107.37 LBS | HEIGHT: 62.99 IN

## 2020-05-03 DIAGNOSIS — D61.818: ICD-10-CM

## 2020-05-03 DIAGNOSIS — Z94.81: ICD-10-CM

## 2020-05-03 DIAGNOSIS — R65.20: ICD-10-CM

## 2020-05-03 DIAGNOSIS — E87.1: ICD-10-CM

## 2020-05-03 DIAGNOSIS — Z92.21: ICD-10-CM

## 2020-05-03 DIAGNOSIS — A41.9: Primary | ICD-10-CM

## 2020-05-03 DIAGNOSIS — N12: ICD-10-CM

## 2020-05-03 DIAGNOSIS — C91.00: ICD-10-CM

## 2020-05-03 DIAGNOSIS — Z94.84: ICD-10-CM

## 2020-05-03 LAB
ALBUMIN SERPL BCP-MCNC: 3.5 G/DL (ref 3.4–5)
ALBUMIN/GLOB SERPL: 1.2 {RATIO} (ref 0.8–1.8)
ALT SERPL W P-5'-P-CCNC: 27 U/L (ref 12–78)
ANION GAP SERPL CALCULATED.4IONS-SCNC: 7 MMOL/L (ref 6–16)
AST SERPL W P-5'-P-CCNC: 75 U/L (ref 12–37)
BASOPHILS # BLD: 0.04 K/MM3 (ref 0–0.23)
BASOPHILS NFR BLD: 2 % (ref 0–2)
BILIRUB SERPL-MCNC: 0.3 MG/DL (ref 0.1–1)
BUN SERPL-MCNC: 13 MG/DL (ref 8–24)
CALCIUM SERPL-MCNC: 9.2 MG/DL (ref 8.5–10.1)
CHLORIDE SERPL-SCNC: 99 MMOL/L (ref 98–108)
CO2 SERPL-SCNC: 29 MMOL/L (ref 21–32)
CREAT SERPL-MCNC: 0.6 MG/DL (ref 0.4–1)
DEPRECATED RDW RBC AUTO: 51.5 FL (ref 35.1–46.3)
EOSINOPHIL # BLD: 0.02 K/MM3 (ref 0–0.68)
EOSINOPHIL NFR BLD: 1 % (ref 0–6)
ERYTHROCYTE [DISTWIDTH] IN BLOOD BY AUTOMATED COUNT: 14 % (ref 11.7–14.2)
GLOBULIN SER CALC-MCNC: 3 G/DL (ref 2.2–4)
GLUCOSE SERPL-MCNC: 140 MG/DL (ref 70–99)
HCT VFR BLD AUTO: 31.3 % (ref 33–51)
HGB BLD-MCNC: 10.2 G/DL (ref 11.5–16)
LYMPHOCYTES # BLD: 0.62 K/MM3 (ref 0.84–5.2)
LYMPHOCYTES NFR BLD: 28 % (ref 21–46)
MCHC RBC AUTO-ENTMCNC: 32.6 G/DL (ref 31.5–36.5)
MCV RBC AUTO: 101 FL (ref 80–100)
METAMYELOCYTES # BLD MANUAL: 0.06 K/MM3 (ref 0–0)
METAMYELOCYTES NFR BLD MANUAL: 3 % (ref 0–0)
MONOCYTES # BLD: 0 K/MM3 (ref 0.16–1.47)
MONOCYTES NFR BLD: 0 % (ref 4–13)
NEUTS BAND NFR BLD MANUAL: 4 % (ref 0–8)
NEUTS SEG # BLD MANUAL: 1.46 K/MM3 (ref 1.96–9.15)
NEUTS SEG NFR BLD MANUAL: 62 % (ref 41–73)
NRBC # BLD AUTO: 0 K/MM3 (ref 0–0.02)
NRBC BLD AUTO-RTO: 0 /100 WBC (ref 0–0.2)
PLATELET # BLD AUTO: 22 K/MM3 (ref 150–400)
POTASSIUM SERPL-SCNC: 3.9 MMOL/L (ref 3.5–5.5)
PROT SERPL-MCNC: 6.5 G/DL (ref 6.4–8.2)
SODIUM SERPL-SCNC: 135 MMOL/L (ref 136–145)
SP GR SPEC: 1.01 (ref 1–1.02)
TOTAL CELLS COUNTED BLD: 100
UROBILINOGEN UR STRIP-MCNC: (no result) MG/DL

## 2020-05-03 PROCEDURE — A9270 NON-COVERED ITEM OR SERVICE: HCPCS

## 2020-05-03 PROCEDURE — P9037 PLATE PHERES LEUKOREDU IRRAD: HCPCS

## 2020-05-03 NOTE — NUR
PATIENT ARRIVED TO ICU 14 VIA GURNEY FROM ED WITH DX OF SEPSIS AND
PYELONEPHRITIS. PATIENT ABLE TO STAND AND TRANSFER TO BED WITHOUT DIFFICULTY.
PLACED ON ICU MONITORS. PATIENT ANSWERING ADMIT HX QUESTIONS AND LOU PO
MEDICATIONS WITHOUT DIFFICULTY. HYPOTENSION, IV FLUIDS STARTED.

## 2020-05-03 NOTE — NUR
DOCTOR SERENE NOTIFIED OF HYPOTENSION, PLAN TO CONTINUE WITH LR BOLUS X1L THEN
RESTART /HR. PATIENT A&O WITH NO COMPLAINTS OF DIZZINESS

## 2020-05-04 LAB
ANION GAP SERPL CALCULATED.4IONS-SCNC: 4 MMOL/L (ref 6–16)
BASOPHILS # BLD: 0 K/MM3 (ref 0–0.23)
BASOPHILS NFR BLD: 0 % (ref 0–2)
BUN SERPL-MCNC: 11 MG/DL (ref 8–24)
CALCIUM SERPL-MCNC: 8.3 MG/DL (ref 8.5–10.1)
CHLORIDE SERPL-SCNC: 111 MMOL/L (ref 98–108)
CO2 SERPL-SCNC: 29 MMOL/L (ref 21–32)
CREAT SERPL-MCNC: 0.5 MG/DL (ref 0.4–1)
DEPRECATED RDW RBC AUTO: 53.1 FL (ref 35.1–46.3)
EOSINOPHIL # BLD: 0.02 K/MM3 (ref 0–0.68)
EOSINOPHIL NFR BLD: 2 % (ref 0–6)
ERYTHROCYTE [DISTWIDTH] IN BLOOD BY AUTOMATED COUNT: 14 % (ref 11.7–14.2)
GLUCOSE SERPL-MCNC: 97 MG/DL (ref 70–99)
HCT VFR BLD AUTO: 24.7 % (ref 33–51)
HGB BLD-MCNC: 8 G/DL (ref 11.5–16)
LYMPHOCYTES # BLD: 0.7 K/MM3 (ref 0.84–5.2)
LYMPHOCYTES NFR BLD: 50 % (ref 21–46)
MCHC RBC AUTO-ENTMCNC: 32.4 G/DL (ref 31.5–36.5)
MCV RBC AUTO: 103 FL (ref 80–100)
MONOCYTES # BLD: 0.1 K/MM3 (ref 0.16–1.47)
MONOCYTES NFR BLD: 8 % (ref 4–13)
MYELOCYTES # BLD MANUAL: 0.02 K/MM3 (ref 0–0)
MYELOCYTES NFR BLD MANUAL: 2 % (ref 0–0)
NEUTS BAND NFR BLD MANUAL: 10 % (ref 0–8)
NEUTS SEG # BLD MANUAL: 0.44 K/MM3 (ref 1.96–9.15)
NEUTS SEG NFR BLD MANUAL: 24 % (ref 41–73)
NRBC # BLD AUTO: 0 K/MM3 (ref 0–0.02)
NRBC BLD AUTO-RTO: 0 /100 WBC (ref 0–0.2)
PLATELET # BLD AUTO: 15 K/MM3 (ref 150–400)
POTASSIUM SERPL-SCNC: 3.6 MMOL/L (ref 3.5–5.5)
SODIUM SERPL-SCNC: 144 MMOL/L (ref 136–145)
TOTAL CELLS COUNTED BLD: 50
VARIANT LYMPHS NFR BLD MANUAL: 4 % (ref 0–0)

## 2020-05-04 PROCEDURE — 30233N1 TRANSFUSION OF NONAUTOLOGOUS RED BLOOD CELLS INTO PERIPHERAL VEIN, PERCUTANEOUS APPROACH: ICD-10-PCS | Performed by: INTERNAL MEDICINE

## 2020-05-04 NOTE — NUR
PT TOLERATED SHOWER WELL. NO COMPLAINTS AT THIS TIME. REPORT PHONED TO
EMILY BAKER IN PREP TO TRANSFER PT TO ROOM 229.

## 2020-05-04 NOTE — NUR
Initial spiritual care note:
 
Lauren is a dewayne young woman who tells me seeing her dtr grow up is what
keeps her fighting this phelan.  She is non-Pentecostal, but appeared to enjoy
talking about her beliefs.  I facilitated conversation to flesh out her
spirituality.  This went really well.  Lauren appeared comforted and thanked me
for visit.  I will remain available.

## 2020-05-04 NOTE — NUR
SHIFT SUMMARY
PT HAD HAD NO CHANGED SINCE ARRIVAL TO FLOOR.  INDEPENDENT IN ROOM.  DENIES
PAIN OR NAUSEA.

## 2020-05-04 NOTE — NUR
SUMMARY
PATIENT RESTING QUIETLY, AWAKENS TO SLIGHT STIMULI WITH NO COMPLAINTS. UP TO
TOILET TO VOID WITHOUT DIFFICULTY. HYPOTENSION CONTINUES, REQUIRING LR BOLUS
DURING THE NIGHT TO KEEP MAP > 60.

## 2020-05-04 NOTE — NUR
DOCTOR SERENE NOTIFIED OF CONTINUED HYPOTENSION. PATIENT AWAKENS TO SLIGHT
STIMULI WITH NO COMPLAINTS, AMB TO TOILET AND VOIDING CLEAR YELLOW URINE. LR
1L BOLUS ORDERED.

## 2020-05-04 NOTE — NUR
PT COMPLAINS OF "KIDNEY" PAIN 5/10. MED WITH TYLENOL 650 MG PO X 1-SEE EMAR.
SBP TRENDING 'S. PT IS HOPEFUL THAT SHE CAN GO HOME TODAY.

## 2020-05-04 NOTE — NUR
PT ARRIVED TO ROOM 229 FROM ICU VIA W/C AT APPROX. 1300.  INDEPENDENT IN ROOM.
DENIES PAIN OR NAUSEA.  ATE HER LUNCH AFTER ARRIVAL.  REPORTS FEELING COLD.

## 2020-05-05 LAB
ANION GAP SERPL CALCULATED.4IONS-SCNC: 5 MMOL/L (ref 6–16)
BASOPHILS # BLD AUTO: 0 K/MM3 (ref 0–0.23)
BASOPHILS NFR BLD AUTO: 0 % (ref 0–2)
BUN SERPL-MCNC: 8 MG/DL (ref 8–24)
CALCIUM SERPL-MCNC: 9.6 MG/DL (ref 8.5–10.1)
CHLORIDE SERPL-SCNC: 108 MMOL/L (ref 98–108)
CO2 SERPL-SCNC: 32 MMOL/L (ref 21–32)
CREAT SERPL-MCNC: 0.49 MG/DL (ref 0.4–1)
DEPRECATED RDW RBC AUTO: 52.6 FL (ref 35.1–46.3)
EOSINOPHIL # BLD AUTO: 0.04 K/MM3 (ref 0–0.68)
EOSINOPHIL NFR BLD AUTO: 3 % (ref 0–6)
ERYTHROCYTE [DISTWIDTH] IN BLOOD BY AUTOMATED COUNT: 13.9 % (ref 11.7–14.2)
GLUCOSE SERPL-MCNC: 119 MG/DL (ref 70–99)
HCT VFR BLD AUTO: 27 % (ref 33–51)
HGB BLD-MCNC: 8.7 G/DL (ref 11.5–16)
IMM GRANULOCYTES # BLD AUTO: 0.01 K/MM3 (ref 0–0.1)
IMM GRANULOCYTES NFR BLD AUTO: 1 % (ref 0–1)
LYMPHOCYTES # BLD AUTO: 0.42 K/MM3 (ref 0.84–5.2)
LYMPHOCYTES NFR BLD AUTO: 35 % (ref 21–46)
MCHC RBC AUTO-ENTMCNC: 32.2 G/DL (ref 31.5–36.5)
MCV RBC AUTO: 102 FL (ref 80–100)
MONOCYTES # BLD AUTO: 0.12 K/MM3 (ref 0.16–1.47)
MONOCYTES NFR BLD AUTO: 10 % (ref 4–13)
NEUTROPHILS # BLD AUTO: 0.62 K/MM3 (ref 1.96–9.15)
NEUTROPHILS NFR BLD AUTO: 51 % (ref 41–73)
NRBC # BLD AUTO: 0 K/MM3 (ref 0–0.02)
NRBC BLD AUTO-RTO: 0 /100 WBC (ref 0–0.2)
PLATELET # BLD AUTO: 46 K/MM3 (ref 150–400)
POTASSIUM SERPL-SCNC: 3.5 MMOL/L (ref 3.5–5.5)
SODIUM SERPL-SCNC: 145 MMOL/L (ref 136–145)

## 2020-05-05 NOTE — NUR
SHIFT SUMMARY:
 
PT COMPLAINS OF PAIN IN RIGHT FLANK ONCE THIS SHIFT AND MEDICATED WITH
TYLENOL PER EMAR. PT SPIKED ONE TEMPERATURE THIS SHIFT .5. TEMP IS NOW
98.0. PT INDEPENDENT IN ROOM. VOIDING WELL. ABX AND FLUIDS INFUSING PER EMAR.
PT EAGER TO DISCHARGE HOME.

## 2020-05-05 NOTE — NUR
DISCHARGE
 
PT EDUCATED ON AND RECEIVED PRINTED DISCHARGE INSTRUCTIONS AND VERBALIZED AN
UNDERSTANDING. RX FOR KEFLEX FAXED OVER TO [facility] PER PT REQUEST. LUBA LEONARD'MARY. PT WAITING FOR FAMILY MEMBER TO TAKE PT HOME. PT GATHERED ALL PERSONAL
BELONGINGS.

## 2020-05-08 ENCOUNTER — HOSPITAL ENCOUNTER (EMERGENCY)
Dept: HOSPITAL 95 - ER | Age: 35
Discharge: HOME | End: 2020-05-08
Payer: COMMERCIAL

## 2020-05-08 VITALS — HEIGHT: 68 IN | BODY MASS INDEX: 17.43 KG/M2 | WEIGHT: 114.99 LBS

## 2020-05-08 DIAGNOSIS — Z79.899: ICD-10-CM

## 2020-05-08 DIAGNOSIS — N23: Primary | ICD-10-CM

## 2020-05-08 LAB
ALBUMIN SERPL BCP-MCNC: 3.6 G/DL (ref 3.4–5)
ALBUMIN/GLOB SERPL: 1.1 {RATIO} (ref 0.8–1.8)
ALT SERPL W P-5'-P-CCNC: 31 U/L (ref 12–78)
ANION GAP SERPL CALCULATED.4IONS-SCNC: 7 MMOL/L (ref 6–16)
AST SERPL W P-5'-P-CCNC: 38 U/L (ref 12–37)
BASOPHILS # BLD: 0.02 K/MM3 (ref 0–0.23)
BASOPHILS NFR BLD: 1 % (ref 0–2)
BILIRUB SERPL-MCNC: 0.2 MG/DL (ref 0.1–1)
BLASTS NFR BLD MANUAL: 4 % (ref 0–0)
BUN SERPL-MCNC: 15 MG/DL (ref 8–24)
CALCIUM SERPL-MCNC: 9.4 MG/DL (ref 8.5–10.1)
CHLORIDE SERPL-SCNC: 104 MMOL/L (ref 98–108)
CO2 SERPL-SCNC: 31 MMOL/L (ref 21–32)
CREAT SERPL-MCNC: 0.58 MG/DL (ref 0.4–1)
DEPRECATED RDW RBC AUTO: 51.8 FL (ref 35.1–46.3)
EOSINOPHIL # BLD: 0 K/MM3 (ref 0–0.68)
EOSINOPHIL NFR BLD: 0 % (ref 0–6)
ERYTHROCYTE [DISTWIDTH] IN BLOOD BY AUTOMATED COUNT: 13.5 % (ref 11.7–14.2)
GLOBULIN SER CALC-MCNC: 3.2 G/DL (ref 2.2–4)
GLUCOSE SERPL-MCNC: 101 MG/DL (ref 70–99)
HCT VFR BLD AUTO: 30.5 % (ref 33–51)
HGB BLD-MCNC: 9.8 G/DL (ref 11.5–16)
LDH SERPL-CCNC: 608 U/L (ref 100–240)
LYMPHOCYTES # BLD: 1.23 K/MM3 (ref 0.84–5.2)
LYMPHOCYTES NFR BLD: 48 % (ref 21–46)
MCHC RBC AUTO-ENTMCNC: 32.1 G/DL (ref 31.5–36.5)
MCV RBC AUTO: 103 FL (ref 80–100)
METAMYELOCYTES # BLD MANUAL: 0.02 K/MM3 (ref 0–0)
METAMYELOCYTES NFR BLD MANUAL: 1 % (ref 0–0)
MONOCYTES # BLD: 0.12 K/MM3 (ref 0.16–1.47)
MONOCYTES NFR BLD: 5 % (ref 4–13)
MYELOCYTES # BLD MANUAL: 0.07 K/MM3 (ref 0–0)
MYELOCYTES NFR BLD MANUAL: 3 % (ref 0–0)
NEUTS BAND NFR BLD MANUAL: 1 % (ref 0–8)
NEUTS SEG # BLD MANUAL: 0.88 K/MM3 (ref 1.96–9.15)
NEUTS SEG NFR BLD MANUAL: 35 % (ref 41–73)
NRBC # BLD AUTO: 0 K/MM3 (ref 0–0.02)
NRBC BLD AUTO-RTO: 0 /100 WBC (ref 0–0.2)
PLATELET # BLD AUTO: 33 K/MM3 (ref 150–400)
POTASSIUM SERPL-SCNC: 3.8 MMOL/L (ref 3.5–5.5)
PROT SERPL-MCNC: 6.8 G/DL (ref 6.4–8.2)
SODIUM SERPL-SCNC: 142 MMOL/L (ref 136–145)
SP GR SPEC: 1.01 (ref 1–1.02)
TOTAL CELLS COUNTED BLD: 100
UROBILINOGEN UR STRIP-MCNC: (no result) MG/DL
VARIANT LYMPHS NFR BLD MANUAL: 2 % (ref 0–0)

## 2020-05-08 PROCEDURE — P9612 CATHETERIZE FOR URINE SPEC: HCPCS

## 2020-05-25 LAB
ALBUMIN SERPL BCP-MCNC: 3.3 G/DL (ref 3.4–5)
ALBUMIN/GLOB SERPL: 1.2 {RATIO} (ref 0.8–1.8)
ALT SERPL W P-5'-P-CCNC: 37 U/L (ref 12–78)
ANION GAP SERPL CALCULATED.4IONS-SCNC: 7 MMOL/L (ref 6–16)
AST SERPL W P-5'-P-CCNC: 20 U/L (ref 12–37)
BASOPHILS # BLD: 0 K/MM3 (ref 0–0.23)
BASOPHILS NFR BLD: 0 % (ref 0–2)
BILIRUB SERPL-MCNC: 0.4 MG/DL (ref 0.1–1)
BUN SERPL-MCNC: 18 MG/DL (ref 8–24)
CALCIUM SERPL-MCNC: 8.8 MG/DL (ref 8.5–10.1)
CHLORIDE SERPL-SCNC: 101 MMOL/L (ref 98–108)
CO2 SERPL-SCNC: 28 MMOL/L (ref 21–32)
CREAT SERPL-MCNC: 0.48 MG/DL (ref 0.4–1)
DEPRECATED RDW RBC AUTO: 47.7 FL (ref 35.1–46.3)
EOSINOPHIL # BLD: 0 K/MM3 (ref 0–0.68)
EOSINOPHIL NFR BLD: 0 % (ref 0–6)
ERYTHROCYTE [DISTWIDTH] IN BLOOD BY AUTOMATED COUNT: 13.6 % (ref 11.7–14.2)
GLOBULIN SER CALC-MCNC: 2.8 G/DL (ref 2.2–4)
GLUCOSE SERPL-MCNC: 164 MG/DL (ref 70–99)
HCT VFR BLD AUTO: 21.2 % (ref 33–51)
HGB BLD-MCNC: 7.1 G/DL (ref 11.5–16)
LDH SERPL-CCNC: 176 U/L (ref 100–240)
LYMPHOCYTES # BLD: 0.07 K/MM3 (ref 0.84–5.2)
LYMPHOCYTES NFR BLD: 32 % (ref 21–46)
MAGNESIUM SERPL-MCNC: 2.1 MG/DL (ref 1.6–2.4)
MCHC RBC AUTO-ENTMCNC: 33.5 G/DL (ref 31.5–36.5)
MCV RBC AUTO: 95 FL (ref 80–100)
METAMYELOCYTES # BLD MANUAL: 0.01 K/MM3 (ref 0–0)
METAMYELOCYTES NFR BLD MANUAL: 5 % (ref 0–0)
MONOCYTES # BLD: 0 K/MM3 (ref 0.16–1.47)
MONOCYTES NFR BLD: 0 % (ref 4–13)
NEUTS BAND NFR BLD MANUAL: 3 % (ref 0–8)
NEUTS SEG # BLD MANUAL: 0.14 K/MM3 (ref 1.96–9.15)
NEUTS SEG NFR BLD MANUAL: 59 % (ref 41–73)
NRBC # BLD AUTO: 0 K/MM3 (ref 0–0.02)
NRBC BLD AUTO-RTO: 0 /100 WBC (ref 0–0.2)
PHOSPHATE SERPL-MCNC: 3.1 MG/DL (ref 2.5–4.9)
PLATELET # BLD AUTO: 2 K/MM3 (ref 150–400)
POTASSIUM SERPL-SCNC: 3.8 MMOL/L (ref 3.5–5.5)
PROT SERPL-MCNC: 6.1 G/DL (ref 6.4–8.2)
SODIUM SERPL-SCNC: 136 MMOL/L (ref 136–145)
TOTAL CELLS COUNTED BLD: 59
URATE SERPL-MCNC: 3.8 MG/DL (ref 2.6–6)

## 2020-05-25 NOTE — NUR
PT STATES SHE THOUGHT SHE WAS HERE FOR A TRANSFUSION, PT WAS TOLD ON 5/24/20
THAT [facility] HAD ORDERS FOR LAB DRAWS ON 5/18/20 AND 5/25/20, SO APPOINTMENT WAS
MADE FOR TODAY 5/25/20 TO DRAW LABS. ORDERS FOR LABS ARE FROM [facility] WITH
DIFFERENT INFUSION AND TRANSFUSION PARAMETERS, HOWEVER PREVIOUS PLT
TRANSFUSION ON 5/18/20 WAS ORDERED BY DR.JOSHUA ELLIS. THIS RN WILL LEAVE NOTE
FOR TUESDAY 5/26/20 RN'S TO CONTACT 'S OFFICE TO GET CLARIFICATION ON
ORDERS FOR PATIENT. THIS RN TOLD PT WE WOULD CALL HER ON LAB LEVELS AND WHAT
HOPEFULLY WE CAN GIVE HER EITHER 5/25/20 OR 5/26/20.

## 2020-05-27 ENCOUNTER — HOSPITAL ENCOUNTER (OUTPATIENT)
Dept: HOSPITAL 95 - ATC | Age: 35
Discharge: HOME | End: 2020-05-27
Attending: INTERNAL MEDICINE
Payer: COMMERCIAL

## 2020-05-27 DIAGNOSIS — F32.9: ICD-10-CM

## 2020-05-27 DIAGNOSIS — C91.02: Primary | ICD-10-CM

## 2020-05-27 DIAGNOSIS — Z79.899: ICD-10-CM

## 2020-05-27 PROCEDURE — P9037 PLATE PHERES LEUKOREDU IRRAD: HCPCS

## 2020-05-27 PROCEDURE — P9016 RBC LEUKOCYTES REDUCED: HCPCS

## 2020-05-30 ENCOUNTER — HOSPITAL ENCOUNTER (OUTPATIENT)
Dept: HOSPITAL 95 - ATC | Age: 35
Discharge: HOME | End: 2020-05-30
Attending: INTERNAL MEDICINE
Payer: COMMERCIAL

## 2020-05-30 DIAGNOSIS — C91.02: Primary | ICD-10-CM

## 2020-05-30 LAB
BASOPHILS # BLD: 0 K/MM3 (ref 0–0.23)
BASOPHILS NFR BLD: 0 % (ref 0–2)
DEPRECATED RDW RBC AUTO: 47.8 FL (ref 35.1–46.3)
EOSINOPHIL # BLD: 0 K/MM3 (ref 0–0.68)
EOSINOPHIL NFR BLD: 0 % (ref 0–6)
ERYTHROCYTE [DISTWIDTH] IN BLOOD BY AUTOMATED COUNT: 13.6 % (ref 11.7–14.2)
HCT VFR BLD AUTO: 34.7 % (ref 33–51)
HGB BLD-MCNC: 11.3 G/DL (ref 11.5–16)
LYMPHOCYTES # BLD: 0.25 K/MM3 (ref 0.84–5.2)
LYMPHOCYTES NFR BLD: 13 % (ref 21–46)
MCHC RBC AUTO-ENTMCNC: 32.6 G/DL (ref 31.5–36.5)
MCV RBC AUTO: 96 FL (ref 80–100)
MONOCYTES # BLD: 0.15 K/MM3 (ref 0.16–1.47)
MONOCYTES NFR BLD: 8 % (ref 4–13)
NEUTS BAND NFR BLD MANUAL: 10 % (ref 0–8)
NEUTS SEG # BLD MANUAL: 1.54 K/MM3 (ref 1.96–9.15)
NEUTS SEG NFR BLD MANUAL: 69 % (ref 41–73)
NRBC # BLD AUTO: 0 K/MM3 (ref 0–0.02)
NRBC BLD AUTO-RTO: 0 /100 WBC (ref 0–0.2)
PLATELET # BLD AUTO: 9 K/MM3 (ref 150–400)
TOTAL CELLS COUNTED BLD: 100

## 2020-05-30 PROCEDURE — P9035 PLATELET PHERES LEUKOREDUCED: HCPCS

## 2020-06-01 LAB
ALBUMIN SERPL BCP-MCNC: 3.6 G/DL (ref 3.4–5)
ALBUMIN/GLOB SERPL: 1.3 {RATIO} (ref 0.8–1.8)
ALT SERPL W P-5'-P-CCNC: 28 U/L (ref 12–78)
ANION GAP SERPL CALCULATED.4IONS-SCNC: 5 MMOL/L (ref 6–16)
AST SERPL W P-5'-P-CCNC: 23 U/L (ref 12–37)
BASOPHILS # BLD AUTO: 0.01 K/MM3 (ref 0–0.23)
BASOPHILS NFR BLD AUTO: 1 % (ref 0–2)
BILIRUB SERPL-MCNC: 0.4 MG/DL (ref 0.1–1)
BUN SERPL-MCNC: 16 MG/DL (ref 8–24)
CALCIUM SERPL-MCNC: 9 MG/DL (ref 8.5–10.1)
CHLORIDE SERPL-SCNC: 104 MMOL/L (ref 98–108)
CO2 SERPL-SCNC: 29 MMOL/L (ref 21–32)
CREAT SERPL-MCNC: 0.62 MG/DL (ref 0.4–1)
DEPRECATED RDW RBC AUTO: 46.9 FL (ref 35.1–46.3)
EOSINOPHIL # BLD AUTO: 0 K/MM3 (ref 0–0.68)
EOSINOPHIL NFR BLD AUTO: 0 % (ref 0–6)
ERYTHROCYTE [DISTWIDTH] IN BLOOD BY AUTOMATED COUNT: 13.4 % (ref 11.7–14.2)
GLOBULIN SER CALC-MCNC: 2.7 G/DL (ref 2.2–4)
GLUCOSE SERPL-MCNC: 133 MG/DL (ref 70–99)
HCT VFR BLD AUTO: 30.9 % (ref 33–51)
HGB BLD-MCNC: 10 G/DL (ref 11.5–16)
IMM GRANULOCYTES # BLD AUTO: 0.02 K/MM3 (ref 0–0.1)
IMM GRANULOCYTES NFR BLD AUTO: 1 % (ref 0–1)
LYMPHOCYTES # BLD AUTO: 0.23 K/MM3 (ref 0.84–5.2)
LYMPHOCYTES NFR BLD AUTO: 15 % (ref 21–46)
MCHC RBC AUTO-ENTMCNC: 32.4 G/DL (ref 31.5–36.5)
MCV RBC AUTO: 96 FL (ref 80–100)
MONOCYTES # BLD AUTO: 0.16 K/MM3 (ref 0.16–1.47)
MONOCYTES NFR BLD AUTO: 11 % (ref 4–13)
NEUTROPHILS # BLD AUTO: 1.11 K/MM3 (ref 1.96–9.15)
NEUTROPHILS NFR BLD AUTO: 73 % (ref 41–73)
NRBC # BLD AUTO: 0 K/MM3 (ref 0–0.02)
NRBC BLD AUTO-RTO: 0 /100 WBC (ref 0–0.2)
PLATELET # BLD AUTO: 19 K/MM3 (ref 150–400)
POTASSIUM SERPL-SCNC: 3.7 MMOL/L (ref 3.5–5.5)
PROT SERPL-MCNC: 6.3 G/DL (ref 6.4–8.2)
SODIUM SERPL-SCNC: 138 MMOL/L (ref 136–145)

## 2020-06-02 ENCOUNTER — HOSPITAL ENCOUNTER (EMERGENCY)
Dept: HOSPITAL 95 - ER | Age: 35
Discharge: HOME | End: 2020-06-02
Payer: COMMERCIAL

## 2020-06-02 ENCOUNTER — HOSPITAL ENCOUNTER (OUTPATIENT)
Dept: HOSPITAL 95 - ATC | Age: 35
Discharge: HOME | End: 2020-06-02
Attending: INTERNAL MEDICINE
Payer: COMMERCIAL

## 2020-06-02 VITALS — BODY MASS INDEX: 17.07 KG/M2 | WEIGHT: 100 LBS | HEIGHT: 64 IN

## 2020-06-02 DIAGNOSIS — Z79.899: ICD-10-CM

## 2020-06-02 DIAGNOSIS — F32.9: ICD-10-CM

## 2020-06-02 DIAGNOSIS — D61.818: ICD-10-CM

## 2020-06-02 DIAGNOSIS — R04.0: Primary | ICD-10-CM

## 2020-06-02 DIAGNOSIS — C91.02: Primary | ICD-10-CM

## 2020-06-02 DIAGNOSIS — K21.9: ICD-10-CM

## 2020-06-02 DIAGNOSIS — G62.9: ICD-10-CM

## 2020-06-02 DIAGNOSIS — Z91.048: ICD-10-CM

## 2020-06-02 DIAGNOSIS — D69.6: ICD-10-CM

## 2020-06-02 LAB
ANION GAP SERPL CALCULATED.4IONS-SCNC: 4 MMOL/L (ref 6–16)
BASOPHILS # BLD: 0 K/MM3 (ref 0–0.23)
BASOPHILS NFR BLD: 0 % (ref 0–2)
BUN SERPL-MCNC: 25 MG/DL (ref 8–24)
CALCIUM SERPL-MCNC: 8.8 MG/DL (ref 8.5–10.1)
CHLORIDE SERPL-SCNC: 105 MMOL/L (ref 98–108)
CO2 SERPL-SCNC: 30 MMOL/L (ref 21–32)
CREAT SERPL-MCNC: 0.54 MG/DL (ref 0.4–1)
DEPRECATED RDW RBC AUTO: 47.3 FL (ref 35.1–46.3)
EOSINOPHIL # BLD: 0 K/MM3 (ref 0–0.68)
EOSINOPHIL NFR BLD: 0 % (ref 0–6)
ERYTHROCYTE [DISTWIDTH] IN BLOOD BY AUTOMATED COUNT: 13.4 % (ref 11.7–14.2)
GLUCOSE SERPL-MCNC: 141 MG/DL (ref 70–99)
HCT VFR BLD AUTO: 26.8 % (ref 33–51)
HGB BLD-MCNC: 8.8 G/DL (ref 11.5–16)
LYMPHOCYTES # BLD: 0.17 K/MM3 (ref 0.84–5.2)
LYMPHOCYTES NFR BLD: 9 % (ref 21–46)
MCHC RBC AUTO-ENTMCNC: 32.8 G/DL (ref 31.5–36.5)
MCV RBC AUTO: 96 FL (ref 80–100)
MONOCYTES # BLD: 0.01 K/MM3 (ref 0.16–1.47)
MONOCYTES NFR BLD: 1 % (ref 4–13)
NEUTS BAND NFR BLD MANUAL: 3 % (ref 0–8)
NEUTS SEG # BLD MANUAL: 1.76 K/MM3 (ref 1.96–9.15)
NEUTS SEG NFR BLD MANUAL: 87 % (ref 41–73)
NRBC # BLD AUTO: 0 K/MM3 (ref 0–0.02)
NRBC BLD AUTO-RTO: 0 /100 WBC (ref 0–0.2)
PLATELET # BLD AUTO: 7 K/MM3 (ref 150–400)
POTASSIUM SERPL-SCNC: 3.8 MMOL/L (ref 3.5–5.5)
SODIUM SERPL-SCNC: 139 MMOL/L (ref 136–145)
TOTAL CELLS COUNTED BLD: 100

## 2020-06-02 PROCEDURE — P9035 PLATELET PHERES LEUKOREDUCED: HCPCS

## 2020-06-02 NOTE — NUR
PT ARRIVED VIA W/C WITH NOSE BLEED, PT STATES THAT SHE HAS HAD NOSE BLEED
SINCE EARLY THIS MORNING AND SHE STATES THAT SHE HAS NEVER HAD A NOSE BLEED
SINCE HER DIAGNOSES OF LEUKEMIA 4 YEARS AGO, PT APPEARS CALM, BUT STATES THAT
SHE CAN TASTE AND FEEL THE BLOOD RUNNING DOWN THE BACK OF HER THROAT. WENT TO
VERIFY PLATLETS AND PT HAS CUT OFF GREEN BLOOD BAND. CALLED [facility] CHARGE IN
ER, PT GIVEN A NOSE CLAMP AND TRANSPORT CAME AND GOT PT, GAVE PT AND ALERTED
EMILY MISHRA ORDERS FOR PLATELET. RETURNED PLATLETS TO BLOOD BANK [place]

## 2020-06-04 ENCOUNTER — HOSPITAL ENCOUNTER (OUTPATIENT)
Dept: HOSPITAL 95 - ATC | Age: 35
Discharge: HOME | End: 2020-06-04
Attending: INTERNAL MEDICINE
Payer: COMMERCIAL

## 2020-06-04 DIAGNOSIS — D61.818: Primary | ICD-10-CM

## 2020-06-04 DIAGNOSIS — C91.02: ICD-10-CM

## 2020-06-04 LAB
BASOPHILS # BLD: 0 K/MM3 (ref 0–0.23)
BASOPHILS NFR BLD: 0 % (ref 0–2)
DEPRECATED RDW RBC AUTO: 48.1 FL (ref 35.1–46.3)
EOSINOPHIL # BLD: 0 K/MM3 (ref 0–0.68)
EOSINOPHIL NFR BLD: 0 % (ref 0–6)
ERYTHROCYTE [DISTWIDTH] IN BLOOD BY AUTOMATED COUNT: 13.8 % (ref 11.7–14.2)
HCT VFR BLD AUTO: 22.6 % (ref 33–51)
HGB BLD-MCNC: 7.5 G/DL (ref 11.5–16)
LYMPHOCYTES # BLD: 0.25 K/MM3 (ref 0.84–5.2)
LYMPHOCYTES NFR BLD: 17 % (ref 21–46)
MCHC RBC AUTO-ENTMCNC: 33.2 G/DL (ref 31.5–36.5)
MCV RBC AUTO: 97 FL (ref 80–100)
MONOCYTES # BLD: 0.04 K/MM3 (ref 0.16–1.47)
MONOCYTES NFR BLD: 3 % (ref 4–13)
NEUTS SEG # BLD MANUAL: 1.21 K/MM3 (ref 1.96–9.15)
NEUTS SEG NFR BLD MANUAL: 80 % (ref 41–73)
NRBC # BLD AUTO: 0.02 K/MM3 (ref 0–0.02)
NRBC BLD AUTO-RTO: 1.3 /100 WBC (ref 0–0.2)
PLATELET # BLD AUTO: 23 K/MM3 (ref 150–400)
TOTAL CELLS COUNTED BLD: 100

## 2020-06-05 ENCOUNTER — HOSPITAL ENCOUNTER (OUTPATIENT)
Dept: HOSPITAL 95 - MEDS | Age: 35
Discharge: HOME | End: 2020-06-05
Attending: INTERNAL MEDICINE
Payer: COMMERCIAL

## 2020-06-05 DIAGNOSIS — C91.02: Primary | ICD-10-CM

## 2020-06-05 DIAGNOSIS — Z79.899: ICD-10-CM

## 2020-06-05 DIAGNOSIS — F32.9: ICD-10-CM

## 2020-06-05 DIAGNOSIS — D61.818: ICD-10-CM

## 2020-06-05 PROCEDURE — P9016 RBC LEUKOCYTES REDUCED: HCPCS

## 2020-06-05 NOTE — NUR
ALERT. ORIENTED. NO OBVIOUS REACTION FROM TRANSFUSION. CAPS AND DRESSING
CHANGED ON PICC. HAS LAB F/U MONDAY. AMBULATORY, STEADY GAIT TO POV.

## 2020-06-08 ENCOUNTER — HOSPITAL ENCOUNTER (OUTPATIENT)
Dept: HOSPITAL 95 - ATC | Age: 35
Discharge: HOME | End: 2020-06-08
Attending: INTERNAL MEDICINE
Payer: COMMERCIAL

## 2020-06-08 DIAGNOSIS — G62.9: ICD-10-CM

## 2020-06-08 DIAGNOSIS — F32.9: ICD-10-CM

## 2020-06-08 DIAGNOSIS — C91.02: ICD-10-CM

## 2020-06-08 DIAGNOSIS — D61.818: Primary | ICD-10-CM

## 2020-06-08 PROCEDURE — P9035 PLATELET PHERES LEUKOREDUCED: HCPCS

## 2020-06-20 ENCOUNTER — HOSPITAL ENCOUNTER (EMERGENCY)
Dept: HOSPITAL 95 - ER | Age: 35
Discharge: HOME | End: 2020-06-20
Payer: COMMERCIAL

## 2020-06-20 VITALS — HEIGHT: 63 IN | BODY MASS INDEX: 18.96 KG/M2 | WEIGHT: 106.99 LBS

## 2020-06-20 DIAGNOSIS — Z91.048: ICD-10-CM

## 2020-06-20 DIAGNOSIS — D69.6: Primary | ICD-10-CM

## 2020-06-20 DIAGNOSIS — Z79.899: ICD-10-CM

## 2020-06-20 DIAGNOSIS — C91.00: ICD-10-CM

## 2020-06-20 DIAGNOSIS — F32.9: ICD-10-CM

## 2020-06-20 DIAGNOSIS — R40.0: ICD-10-CM

## 2020-06-20 LAB
ALBUMIN SERPL BCP-MCNC: 3.4 G/DL (ref 3.4–5)
ALBUMIN/GLOB SERPL: 1.2 {RATIO} (ref 0.8–1.8)
ALT SERPL W P-5'-P-CCNC: 63 U/L (ref 12–78)
ANION GAP SERPL CALCULATED.4IONS-SCNC: 7 MMOL/L (ref 6–16)
AST SERPL W P-5'-P-CCNC: 26 U/L (ref 12–37)
BASOPHILS # BLD AUTO: 0.01 K/MM3 (ref 0–0.23)
BASOPHILS # BLD AUTO: 0.02 K/MM3 (ref 0–0.23)
BASOPHILS # BLD: 0.01 K/MM3 (ref 0–0.23)
BASOPHILS NFR BLD AUTO: 1 % (ref 0–2)
BASOPHILS NFR BLD AUTO: 2 % (ref 0–2)
BASOPHILS NFR BLD: 1 % (ref 0–2)
BILIRUB SERPL-MCNC: 0.8 MG/DL (ref 0.1–1)
BUN SERPL-MCNC: 16 MG/DL (ref 8–24)
CALCIUM SERPL-MCNC: 9 MG/DL (ref 8.5–10.1)
CHLORIDE SERPL-SCNC: 107 MMOL/L (ref 98–108)
CO2 SERPL-SCNC: 29 MMOL/L (ref 21–32)
CREAT SERPL-MCNC: 0.5 MG/DL (ref 0.4–1)
DEPRECATED RDW RBC AUTO: 46.4 FL (ref 35.1–46.3)
DEPRECATED RDW RBC AUTO: 46.9 FL (ref 35.1–46.3)
EOSINOPHIL # BLD AUTO: 0.01 K/MM3 (ref 0–0.68)
EOSINOPHIL # BLD AUTO: 0.02 K/MM3 (ref 0–0.68)
EOSINOPHIL # BLD: 0.01 K/MM3 (ref 0–0.68)
EOSINOPHIL NFR BLD AUTO: 1 % (ref 0–6)
EOSINOPHIL NFR BLD AUTO: 2 % (ref 0–6)
EOSINOPHIL NFR BLD: 1 % (ref 0–6)
ERYTHROCYTE [DISTWIDTH] IN BLOOD BY AUTOMATED COUNT: 15.8 % (ref 11.7–14.2)
ERYTHROCYTE [DISTWIDTH] IN BLOOD BY AUTOMATED COUNT: 15.9 % (ref 11.7–14.2)
GLOBULIN SER CALC-MCNC: 2.8 G/DL (ref 2.2–4)
GLUCOSE SERPL-MCNC: 122 MG/DL (ref 70–99)
HCT VFR BLD AUTO: 24.6 % (ref 33–51)
HCT VFR BLD AUTO: 26.6 % (ref 33–51)
HGB BLD-MCNC: 8.1 G/DL (ref 11.5–16)
HGB BLD-MCNC: 8.8 G/DL (ref 11.5–16)
IMM GRANULOCYTES # BLD AUTO: 0.21 K/MM3 (ref 0–0.1)
IMM GRANULOCYTES # BLD AUTO: 0.29 K/MM3 (ref 0–0.1)
IMM GRANULOCYTES NFR BLD AUTO: 18 % (ref 0–1)
IMM GRANULOCYTES NFR BLD AUTO: 23 % (ref 0–1)
LYMPHOCYTES # BLD AUTO: 0.21 K/MM3 (ref 0.84–5.2)
LYMPHOCYTES # BLD AUTO: 0.32 K/MM3 (ref 0.84–5.2)
LYMPHOCYTES # BLD: 0.26 K/MM3 (ref 0.84–5.2)
LYMPHOCYTES NFR BLD AUTO: 17 % (ref 21–46)
LYMPHOCYTES NFR BLD AUTO: 27 % (ref 21–46)
LYMPHOCYTES NFR BLD: 21 % (ref 21–46)
MCHC RBC AUTO-ENTMCNC: 32.9 G/DL (ref 31.5–36.5)
MCHC RBC AUTO-ENTMCNC: 33.1 G/DL (ref 31.5–36.5)
MCV RBC AUTO: 93 FL (ref 80–100)
MCV RBC AUTO: 94 FL (ref 80–100)
METAMYELOCYTES # BLD MANUAL: 0.01 K/MM3 (ref 0–0)
METAMYELOCYTES NFR BLD MANUAL: 1 % (ref 0–0)
MONOCYTES # BLD AUTO: 0.2 K/MM3 (ref 0.16–1.47)
MONOCYTES # BLD AUTO: 0.24 K/MM3 (ref 0.16–1.47)
MONOCYTES # BLD: 0.21 K/MM3 (ref 0.16–1.47)
MONOCYTES NFR BLD AUTO: 17 % (ref 4–13)
MONOCYTES NFR BLD AUTO: 19 % (ref 4–13)
MONOCYTES NFR BLD: 17 % (ref 4–13)
MYELOCYTES # BLD MANUAL: 0.02 K/MM3 (ref 0–0)
MYELOCYTES NFR BLD MANUAL: 2 % (ref 0–0)
NEUTROPHILS # BLD AUTO: 0.41 K/MM3 (ref 1.96–9.15)
NEUTROPHILS # BLD AUTO: 0.47 K/MM3 (ref 1.96–9.15)
NEUTROPHILS NFR BLD AUTO: 35 % (ref 41–73)
NEUTROPHILS NFR BLD AUTO: 38 % (ref 41–73)
NEUTS BAND NFR BLD MANUAL: 8 % (ref 0–8)
NEUTS SEG # BLD MANUAL: 0.7 K/MM3 (ref 1.96–9.15)
NEUTS SEG NFR BLD MANUAL: 49 % (ref 41–73)
NRBC # BLD AUTO: 0 K/MM3 (ref 0–0.02)
NRBC # BLD AUTO: 0.03 K/MM3 (ref 0–0.02)
NRBC BLD AUTO-RTO: 0 /100 WBC (ref 0–0.2)
NRBC BLD AUTO-RTO: 2.4 /100 WBC (ref 0–0.2)
PLATELET # BLD AUTO: 2 K/MM3 (ref 150–400)
PLATELET # BLD AUTO: 21 K/MM3 (ref 150–400)
POTASSIUM SERPL-SCNC: 3.7 MMOL/L (ref 3.5–5.5)
PROT SERPL-MCNC: 6.2 G/DL (ref 6.4–8.2)
SODIUM SERPL-SCNC: 143 MMOL/L (ref 136–145)
TOTAL CELLS COUNTED BLD: 100

## 2020-06-20 PROCEDURE — P9035 PLATELET PHERES LEUKOREDUCED: HCPCS

## 2020-07-06 LAB
DEPRECATED RDW RBC AUTO: 49.7 FL (ref 35.1–46.3)
ERYTHROCYTE [DISTWIDTH] IN BLOOD BY AUTOMATED COUNT: 21.7 % (ref 11.7–14.2)
HCT VFR BLD AUTO: 28.8 % (ref 33–51)
HGB BLD-MCNC: 9.4 G/DL (ref 11.5–16)
MCHC RBC AUTO-ENTMCNC: 32.6 G/DL (ref 31.5–36.5)
MCV RBC AUTO: 104 FL (ref 80–100)
NRBC # BLD AUTO: 0 K/MM3 (ref 0–0.02)
NRBC BLD AUTO-RTO: 0 /100 WBC (ref 0–0.2)
PLATELET # BLD AUTO: 6 K/MM3 (ref 150–400)

## 2020-07-07 ENCOUNTER — HOSPITAL ENCOUNTER (OUTPATIENT)
Dept: HOSPITAL 95 - ATC | Age: 35
Discharge: HOME | End: 2020-07-07
Attending: INTERNAL MEDICINE
Payer: COMMERCIAL

## 2020-07-07 DIAGNOSIS — Z79.899: ICD-10-CM

## 2020-07-07 DIAGNOSIS — Z88.8: ICD-10-CM

## 2020-07-07 DIAGNOSIS — F32.9: ICD-10-CM

## 2020-07-07 DIAGNOSIS — C91.02: Primary | ICD-10-CM

## 2020-07-07 DIAGNOSIS — G62.9: ICD-10-CM

## 2020-07-07 PROCEDURE — P9037 PLATE PHERES LEUKOREDU IRRAD: HCPCS

## 2020-07-20 LAB
BASOPHILS # BLD AUTO: 0.01 K/MM3 (ref 0–0.23)
BASOPHILS NFR BLD AUTO: 1 % (ref 0–2)
DEPRECATED RDW RBC AUTO: (no result) FL (ref 35.1–46.3)
EOSINOPHIL # BLD AUTO: 0.05 K/MM3 (ref 0–0.68)
EOSINOPHIL NFR BLD AUTO: 2 % (ref 0–6)
ERYTHROCYTE [DISTWIDTH] IN BLOOD BY AUTOMATED COUNT: (no result) % (ref 11.7–14.2)
HCT VFR BLD AUTO: 34.1 % (ref 33–51)
HGB BLD-MCNC: 10.8 G/DL (ref 11.5–16)
IMM GRANULOCYTES # BLD AUTO: 0 K/MM3 (ref 0–0.1)
IMM GRANULOCYTES NFR BLD AUTO: 0 % (ref 0–1)
LYMPHOCYTES # BLD AUTO: 0.55 K/MM3 (ref 0.84–5.2)
LYMPHOCYTES NFR BLD AUTO: 26 % (ref 21–46)
MCHC RBC AUTO-ENTMCNC: 31.7 G/DL (ref 31.5–36.5)
MCV RBC AUTO: 114 FL (ref 80–100)
MONOCYTES # BLD AUTO: 0.35 K/MM3 (ref 0.16–1.47)
MONOCYTES NFR BLD AUTO: 16 % (ref 4–13)
NEUTROPHILS # BLD AUTO: 1.18 K/MM3 (ref 1.96–9.15)
NEUTROPHILS NFR BLD AUTO: 55 % (ref 41–73)
NRBC # BLD AUTO: 0 K/MM3 (ref 0–0.02)
NRBC BLD AUTO-RTO: 0 /100 WBC (ref 0–0.2)
PLATELET # BLD AUTO: 11 K/MM3 (ref 150–400)

## 2020-07-21 ENCOUNTER — HOSPITAL ENCOUNTER (OUTPATIENT)
Dept: HOSPITAL 95 - ATC | Age: 35
Discharge: HOME | End: 2020-07-21
Attending: INTERNAL MEDICINE
Payer: COMMERCIAL

## 2020-07-21 DIAGNOSIS — Z79.899: ICD-10-CM

## 2020-07-21 DIAGNOSIS — F32.9: ICD-10-CM

## 2020-07-21 DIAGNOSIS — Z88.8: ICD-10-CM

## 2020-07-21 DIAGNOSIS — C91.02: Primary | ICD-10-CM

## 2020-07-21 DIAGNOSIS — G62.9: ICD-10-CM

## 2020-07-21 PROCEDURE — P9037 PLATE PHERES LEUKOREDU IRRAD: HCPCS

## 2020-07-28 ENCOUNTER — HOSPITAL ENCOUNTER (OUTPATIENT)
Dept: HOSPITAL 95 - ATC | Age: 35
Discharge: HOME | End: 2020-07-28
Attending: INTERNAL MEDICINE
Payer: COMMERCIAL

## 2020-07-28 DIAGNOSIS — C91.02: Primary | ICD-10-CM

## 2020-07-28 DIAGNOSIS — F32.9: ICD-10-CM

## 2020-07-28 DIAGNOSIS — Z88.8: ICD-10-CM

## 2020-07-28 PROCEDURE — P9037 PLATE PHERES LEUKOREDU IRRAD: HCPCS

## 2020-08-11 ENCOUNTER — HOSPITAL ENCOUNTER (OUTPATIENT)
Dept: HOSPITAL 95 - ATC | Age: 35
Discharge: HOME | End: 2020-08-11
Attending: INTERNAL MEDICINE
Payer: COMMERCIAL

## 2020-08-11 DIAGNOSIS — G62.9: ICD-10-CM

## 2020-08-11 DIAGNOSIS — F32.9: ICD-10-CM

## 2020-08-11 DIAGNOSIS — Z94.84: ICD-10-CM

## 2020-08-11 DIAGNOSIS — Z92.21: ICD-10-CM

## 2020-08-11 DIAGNOSIS — C91.02: ICD-10-CM

## 2020-08-11 DIAGNOSIS — D61.818: Primary | ICD-10-CM

## 2020-08-11 PROCEDURE — P9037 PLATE PHERES LEUKOREDU IRRAD: HCPCS

## 2020-08-13 LAB
BASOPHILS # BLD AUTO: 0.02 K/MM3 (ref 0–0.23)
BASOPHILS NFR BLD AUTO: 1 % (ref 0–2)
DEPRECATED RDW RBC AUTO: 68.3 FL (ref 35.1–46.3)
EOSINOPHIL # BLD AUTO: 0.11 K/MM3 (ref 0–0.68)
EOSINOPHIL NFR BLD AUTO: 6 % (ref 0–6)
ERYTHROCYTE [DISTWIDTH] IN BLOOD BY AUTOMATED COUNT: 16.6 % (ref 11.7–14.2)
HCT VFR BLD AUTO: 34.5 % (ref 33–51)
HGB BLD-MCNC: 11.4 G/DL (ref 11.5–16)
IMM GRANULOCYTES # BLD AUTO: 0.01 K/MM3 (ref 0–0.1)
IMM GRANULOCYTES NFR BLD AUTO: 1 % (ref 0–1)
LYMPHOCYTES # BLD AUTO: 0.53 K/MM3 (ref 0.84–5.2)
LYMPHOCYTES NFR BLD AUTO: 27 % (ref 21–46)
MCHC RBC AUTO-ENTMCNC: 33 G/DL (ref 31.5–36.5)
MCV RBC AUTO: 114 FL (ref 80–100)
MONOCYTES # BLD AUTO: 0.22 K/MM3 (ref 0.16–1.47)
MONOCYTES NFR BLD AUTO: 11 % (ref 4–13)
NEUTROPHILS # BLD AUTO: 1.08 K/MM3 (ref 1.96–9.15)
NEUTROPHILS NFR BLD AUTO: 55 % (ref 41–73)
NRBC # BLD AUTO: 0 K/MM3 (ref 0–0.02)
NRBC BLD AUTO-RTO: 0 /100 WBC (ref 0–0.2)
PLATELET # BLD AUTO: 17 K/MM3 (ref 150–400)

## 2020-08-14 ENCOUNTER — HOSPITAL ENCOUNTER (OUTPATIENT)
Dept: HOSPITAL 95 - ATC | Age: 35
Discharge: HOME | End: 2020-08-14
Attending: INTERNAL MEDICINE
Payer: COMMERCIAL

## 2020-08-14 DIAGNOSIS — Z88.8: ICD-10-CM

## 2020-08-14 DIAGNOSIS — C91.02: Primary | ICD-10-CM

## 2020-08-14 DIAGNOSIS — G62.9: ICD-10-CM

## 2020-08-14 DIAGNOSIS — F32.9: ICD-10-CM

## 2020-08-14 PROCEDURE — P9037 PLATE PHERES LEUKOREDU IRRAD: HCPCS

## 2020-08-24 LAB
BASOPHILS # BLD AUTO: 0.01 K/MM3 (ref 0–0.23)
BASOPHILS NFR BLD AUTO: 0 % (ref 0–2)
DEPRECATED RDW RBC AUTO: 62.1 FL (ref 35.1–46.3)
EOSINOPHIL # BLD AUTO: 0.08 K/MM3 (ref 0–0.68)
EOSINOPHIL NFR BLD AUTO: 3 % (ref 0–6)
ERYTHROCYTE [DISTWIDTH] IN BLOOD BY AUTOMATED COUNT: 14.8 % (ref 11.7–14.2)
HCT VFR BLD AUTO: 35.6 % (ref 33–51)
HGB BLD-MCNC: 11.8 G/DL (ref 11.5–16)
IMM GRANULOCYTES # BLD AUTO: 0 K/MM3 (ref 0–0.1)
IMM GRANULOCYTES NFR BLD AUTO: 0 % (ref 0–1)
LYMPHOCYTES # BLD AUTO: 0.61 K/MM3 (ref 0.84–5.2)
LYMPHOCYTES NFR BLD AUTO: 24 % (ref 21–46)
MCHC RBC AUTO-ENTMCNC: 33.1 G/DL (ref 31.5–36.5)
MCV RBC AUTO: 112 FL (ref 80–100)
MONOCYTES # BLD AUTO: 0.28 K/MM3 (ref 0.16–1.47)
MONOCYTES NFR BLD AUTO: 11 % (ref 4–13)
NEUTROPHILS # BLD AUTO: 1.58 K/MM3 (ref 1.96–9.15)
NEUTROPHILS NFR BLD AUTO: 62 % (ref 41–73)
NRBC # BLD AUTO: 0 K/MM3 (ref 0–0.02)
NRBC BLD AUTO-RTO: 0 /100 WBC (ref 0–0.2)
PLATELET # BLD AUTO: 16 K/MM3 (ref 150–400)

## 2020-08-25 ENCOUNTER — HOSPITAL ENCOUNTER (OUTPATIENT)
Dept: HOSPITAL 95 - ATC | Age: 35
Discharge: HOME | End: 2020-08-25
Attending: INTERNAL MEDICINE
Payer: COMMERCIAL

## 2020-08-25 DIAGNOSIS — F32.9: ICD-10-CM

## 2020-08-25 DIAGNOSIS — Z88.8: ICD-10-CM

## 2020-08-25 DIAGNOSIS — C91.02: Primary | ICD-10-CM

## 2020-08-25 DIAGNOSIS — Z79.899: ICD-10-CM

## 2020-08-25 PROCEDURE — P9037 PLATE PHERES LEUKOREDU IRRAD: HCPCS

## 2020-11-02 LAB
BASOPHILS # BLD: 0 K/MM3 (ref 0–0.23)
BASOPHILS NFR BLD: 0 % (ref 0–2)
DEPRECATED RDW RBC AUTO: 54.4 FL (ref 35.1–46.3)
EOSINOPHIL # BLD: 0.03 K/MM3 (ref 0–0.68)
EOSINOPHIL NFR BLD: 4 % (ref 0–6)
ERYTHROCYTE [DISTWIDTH] IN BLOOD BY AUTOMATED COUNT: 14 % (ref 11.7–14.2)
HCT VFR BLD AUTO: 25.5 % (ref 33–51)
HGB BLD-MCNC: 8.3 G/DL (ref 11.5–16)
LYMPHOCYTES # BLD: 0.13 K/MM3 (ref 0.84–5.2)
LYMPHOCYTES NFR BLD: 16 % (ref 21–46)
MCHC RBC AUTO-ENTMCNC: 32.5 G/DL (ref 31.5–36.5)
MCV RBC AUTO: 112 FL (ref 80–100)
MONOCYTES # BLD: 0.08 K/MM3 (ref 0.16–1.47)
MONOCYTES NFR BLD: 10 % (ref 4–13)
NEUTS SEG # BLD MANUAL: 0.6 K/MM3 (ref 1.96–9.15)
NEUTS SEG NFR BLD MANUAL: 70 % (ref 41–73)
NRBC # BLD AUTO: 0 K/MM3 (ref 0–0.02)
NRBC BLD AUTO-RTO: 0 /100 WBC (ref 0–0.2)
PLATELET # BLD AUTO: 9 K/MM3 (ref 150–400)
TOTAL CELLS COUNTED BLD: 50

## 2020-11-03 ENCOUNTER — HOSPITAL ENCOUNTER (OUTPATIENT)
Dept: HOSPITAL 95 - ATC | Age: 35
Discharge: HOME | End: 2020-11-03
Attending: INTERNAL MEDICINE
Payer: COMMERCIAL

## 2020-11-03 DIAGNOSIS — Z79.899: ICD-10-CM

## 2020-11-03 DIAGNOSIS — F32.9: ICD-10-CM

## 2020-11-03 DIAGNOSIS — C91.02: Primary | ICD-10-CM

## 2020-11-03 DIAGNOSIS — D61.818: ICD-10-CM

## 2020-11-03 PROCEDURE — P9037 PLATE PHERES LEUKOREDU IRRAD: HCPCS

## 2020-11-10 ENCOUNTER — HOSPITAL ENCOUNTER (OUTPATIENT)
Dept: HOSPITAL 95 - ATC | Age: 35
Discharge: HOME | End: 2020-11-10
Attending: INTERNAL MEDICINE
Payer: COMMERCIAL

## 2020-11-10 DIAGNOSIS — G62.9: ICD-10-CM

## 2020-11-10 DIAGNOSIS — Z79.899: ICD-10-CM

## 2020-11-10 DIAGNOSIS — F32.9: ICD-10-CM

## 2020-11-10 DIAGNOSIS — C91.02: Primary | ICD-10-CM

## 2020-11-10 DIAGNOSIS — D61.818: ICD-10-CM

## 2020-11-10 DIAGNOSIS — Z88.8: ICD-10-CM

## 2020-11-10 PROCEDURE — P9035 PLATELET PHERES LEUKOREDUCED: HCPCS

## 2020-11-16 ENCOUNTER — HOSPITAL ENCOUNTER (INPATIENT)
Dept: HOSPITAL 95 - ER | Age: 35
LOS: 2 days | Discharge: HOME | DRG: 809 | End: 2020-11-18
Attending: HOSPITALIST | Admitting: HOSPITALIST
Payer: COMMERCIAL

## 2020-11-16 VITALS — BODY MASS INDEX: 19.31 KG/M2 | WEIGHT: 113.1 LBS | HEIGHT: 64.02 IN

## 2020-11-16 DIAGNOSIS — R65.10: ICD-10-CM

## 2020-11-16 DIAGNOSIS — Z94.81: ICD-10-CM

## 2020-11-16 DIAGNOSIS — D61.818: ICD-10-CM

## 2020-11-16 DIAGNOSIS — Z20.828: ICD-10-CM

## 2020-11-16 DIAGNOSIS — D70.9: Primary | ICD-10-CM

## 2020-11-16 DIAGNOSIS — R50.81: ICD-10-CM

## 2020-11-16 DIAGNOSIS — C91.00: ICD-10-CM

## 2020-11-16 DIAGNOSIS — I95.9: ICD-10-CM

## 2020-11-16 LAB
ALBUMIN SERPL BCP-MCNC: 3.9 G/DL (ref 3.4–5)
ALBUMIN/GLOB SERPL: 1.5 {RATIO} (ref 0.8–1.8)
ALT SERPL W P-5'-P-CCNC: 162 U/L (ref 12–78)
ANION GAP SERPL CALCULATED.4IONS-SCNC: 6 MMOL/L (ref 6–16)
AST SERPL W P-5'-P-CCNC: 63 U/L (ref 12–37)
BASOPHILS # BLD: 0 K/MM3 (ref 0–0.23)
BASOPHILS NFR BLD: 0 % (ref 0–2)
BILIRUB SERPL-MCNC: 0.7 MG/DL (ref 0.1–1)
BUN SERPL-MCNC: 20 MG/DL (ref 8–24)
CALCIUM SERPL-MCNC: 9.6 MG/DL (ref 8.5–10.1)
CHLORIDE SERPL-SCNC: 103 MMOL/L (ref 98–108)
CO2 SERPL-SCNC: 33 MMOL/L (ref 21–32)
CREAT SERPL-MCNC: 0.7 MG/DL (ref 0.4–1)
DEPRECATED RDW RBC AUTO: 61.5 FL (ref 35.1–46.3)
EOSINOPHIL # BLD: 0 K/MM3 (ref 0–0.68)
EOSINOPHIL NFR BLD: 0 % (ref 0–6)
ERYTHROCYTE [DISTWIDTH] IN BLOOD BY AUTOMATED COUNT: 16.2 % (ref 11.7–14.2)
GLOBULIN SER CALC-MCNC: 2.6 G/DL (ref 2.2–4)
GLUCOSE SERPL-MCNC: 119 MG/DL (ref 70–99)
HCT VFR BLD AUTO: 21.2 % (ref 33–51)
HGB BLD-MCNC: 7 G/DL (ref 11.5–16)
LYMPHOCYTES # BLD: 0.24 K/MM3 (ref 0.84–5.2)
LYMPHOCYTES NFR BLD: 40 % (ref 21–46)
MCHC RBC AUTO-ENTMCNC: 33 G/DL (ref 31.5–36.5)
MCV RBC AUTO: 109 FL (ref 80–100)
MONOCYTES # BLD: 0.04 K/MM3 (ref 0.16–1.47)
MONOCYTES NFR BLD: 8 % (ref 4–13)
NEUTS BAND NFR BLD MANUAL: 8 % (ref 0–8)
NEUTS SEG # BLD MANUAL: 0.32 K/MM3 (ref 1.96–9.15)
NEUTS SEG NFR BLD MANUAL: 44 % (ref 41–73)
NRBC # BLD AUTO: 0 K/MM3 (ref 0–0.02)
NRBC BLD AUTO-RTO: 0 /100 WBC (ref 0–0.2)
PLATELET # BLD AUTO: 9 K/MM3 (ref 150–400)
POTASSIUM SERPL-SCNC: 3.7 MMOL/L (ref 3.5–5.5)
PROT SERPL-MCNC: 6.5 G/DL (ref 6.4–8.2)
SODIUM SERPL-SCNC: 142 MMOL/L (ref 136–145)
SP GR SPEC: 1.01 (ref 1–1.02)
TOTAL CELLS COUNTED BLD: 50
UROBILINOGEN UR STRIP-MCNC: (no result) MG/DL

## 2020-11-16 PROCEDURE — P9016 RBC LEUKOCYTES REDUCED: HCPCS

## 2020-11-16 PROCEDURE — P9037 PLATE PHERES LEUKOREDU IRRAD: HCPCS

## 2020-11-16 PROCEDURE — A9270 NON-COVERED ITEM OR SERVICE: HCPCS

## 2020-11-16 PROCEDURE — 30233N1 TRANSFUSION OF NONAUTOLOGOUS RED BLOOD CELLS INTO PERIPHERAL VEIN, PERCUTANEOUS APPROACH: ICD-10-PCS | Performed by: HOSPITALIST

## 2020-11-17 LAB
ALBUMIN SERPL BCP-MCNC: 3.2 G/DL (ref 3.4–5)
ALBUMIN/GLOB SERPL: 1.3 {RATIO} (ref 0.8–1.8)
ALT SERPL W P-5'-P-CCNC: 115 U/L (ref 12–78)
ANION GAP SERPL CALCULATED.4IONS-SCNC: 5 MMOL/L (ref 6–16)
AST SERPL W P-5'-P-CCNC: 38 U/L (ref 12–37)
BASOPHILS # BLD AUTO: 0 K/MM3 (ref 0–0.23)
BASOPHILS # BLD: 0 K/MM3 (ref 0–0.23)
BASOPHILS NFR BLD AUTO: 0 % (ref 0–2)
BASOPHILS NFR BLD: 0 % (ref 0–2)
BILIRUB SERPL-MCNC: 0.5 MG/DL (ref 0.1–1)
BUN SERPL-MCNC: 15 MG/DL (ref 8–24)
CALCIUM SERPL-MCNC: 8.8 MG/DL (ref 8.5–10.1)
CHLORIDE SERPL-SCNC: 108 MMOL/L (ref 98–108)
CO2 SERPL-SCNC: 30 MMOL/L (ref 21–32)
CREAT SERPL-MCNC: 0.62 MG/DL (ref 0.4–1)
DEPRECATED RDW RBC AUTO: 62.1 FL (ref 35.1–46.3)
EOSINOPHIL # BLD AUTO: 0 K/MM3 (ref 0–0.68)
EOSINOPHIL # BLD: 0 K/MM3 (ref 0–0.68)
EOSINOPHIL NFR BLD AUTO: 0 % (ref 0–6)
EOSINOPHIL NFR BLD: 1 % (ref 0–6)
ERYTHROCYTE [DISTWIDTH] IN BLOOD BY AUTOMATED COUNT: 16.1 % (ref 11.7–14.2)
GLOBULIN SER CALC-MCNC: 2.5 G/DL (ref 2.2–4)
GLUCOSE SERPL-MCNC: 93 MG/DL (ref 70–99)
HCT VFR BLD AUTO: 19 % (ref 33–51)
HGB BLD-MCNC: 6.2 G/DL (ref 11.5–16)
IMM GRANULOCYTES # BLD AUTO: 0.07 K/MM3 (ref 0–0.1)
IMM GRANULOCYTES NFR BLD AUTO: 12 % (ref 0–1)
LYMPHOCYTES # BLD AUTO: 0.18 K/MM3 (ref 0.84–5.2)
LYMPHOCYTES # BLD: 0.2 K/MM3 (ref 0.84–5.2)
LYMPHOCYTES NFR BLD AUTO: 31 % (ref 21–46)
LYMPHOCYTES NFR BLD: 35 % (ref 21–46)
MCHC RBC AUTO-ENTMCNC: 32.6 G/DL (ref 31.5–36.5)
MCV RBC AUTO: 110 FL (ref 80–100)
MONOCYTES # BLD AUTO: 0.12 K/MM3 (ref 0.16–1.47)
MONOCYTES # BLD: 0.11 K/MM3 (ref 0.16–1.47)
MONOCYTES NFR BLD AUTO: 21 % (ref 4–13)
MONOCYTES NFR BLD: 19 % (ref 4–13)
NEUTROPHILS # BLD AUTO: 0.21 K/MM3 (ref 1.96–9.15)
NEUTROPHILS NFR BLD AUTO: 36 % (ref 41–73)
NEUTS BAND NFR BLD MANUAL: 5 % (ref 0–8)
NEUTS SEG # BLD MANUAL: 0.26 K/MM3 (ref 1.96–9.15)
NEUTS SEG NFR BLD MANUAL: 40 % (ref 41–73)
NRBC # BLD AUTO: 0 K/MM3 (ref 0–0.02)
NRBC BLD AUTO-RTO: 0 /100 WBC (ref 0–0.2)
PLATELET # BLD AUTO: 40 K/MM3 (ref 150–400)
POTASSIUM SERPL-SCNC: 3.9 MMOL/L (ref 3.5–5.5)
PROT SERPL-MCNC: 5.7 G/DL (ref 6.4–8.2)
SODIUM SERPL-SCNC: 143 MMOL/L (ref 136–145)
TOTAL CELLS COUNTED BLD: 100

## 2020-11-18 LAB
DEPRECATED RDW RBC AUTO: 67.4 FL (ref 35.1–46.3)
ERYTHROCYTE [DISTWIDTH] IN BLOOD BY AUTOMATED COUNT: 18.8 % (ref 11.7–14.2)
HCT VFR BLD AUTO: 26.5 % (ref 33–51)
HGB BLD-MCNC: 9.1 G/DL (ref 11.5–16)
MCHC RBC AUTO-ENTMCNC: 34.3 G/DL (ref 31.5–36.5)
MCV RBC AUTO: 102 FL (ref 80–100)
NRBC # BLD AUTO: 0 K/MM3 (ref 0–0.02)
NRBC BLD AUTO-RTO: 0 /100 WBC (ref 0–0.2)
PLATELET # BLD AUTO: 31 K/MM3 (ref 150–400)

## 2020-11-25 ENCOUNTER — HOSPITAL ENCOUNTER (OUTPATIENT)
Dept: HOSPITAL 95 - ATC | Age: 35
Discharge: HOME | End: 2020-11-25
Attending: INTERNAL MEDICINE
Payer: COMMERCIAL

## 2020-11-25 DIAGNOSIS — C91.02: Primary | ICD-10-CM

## 2020-11-25 DIAGNOSIS — Z91.048: ICD-10-CM

## 2020-11-25 DIAGNOSIS — G62.9: ICD-10-CM

## 2020-11-25 DIAGNOSIS — F32.9: ICD-10-CM

## 2020-11-25 DIAGNOSIS — D61.818: ICD-10-CM

## 2020-11-25 DIAGNOSIS — Z79.899: ICD-10-CM

## 2020-11-25 PROCEDURE — 30233R1 TRANSFUSION OF NONAUTOLOGOUS PLATELETS INTO PERIPHERAL VEIN, PERCUTANEOUS APPROACH: ICD-10-PCS | Performed by: INTERNAL MEDICINE

## 2020-11-25 PROCEDURE — P9037 PLATE PHERES LEUKOREDU IRRAD: HCPCS

## 2020-12-16 ENCOUNTER — HOSPITAL ENCOUNTER (OUTPATIENT)
Dept: HOSPITAL 95 - ATC | Age: 35
Discharge: HOME | End: 2020-12-16
Attending: INTERNAL MEDICINE
Payer: COMMERCIAL

## 2020-12-16 DIAGNOSIS — C91.02: ICD-10-CM

## 2020-12-16 DIAGNOSIS — D64.81: Primary | ICD-10-CM

## 2020-12-16 DIAGNOSIS — Z91.09: ICD-10-CM

## 2020-12-16 DIAGNOSIS — T45.1X5A: ICD-10-CM

## 2020-12-16 DIAGNOSIS — Z79.899: ICD-10-CM

## 2020-12-16 PROCEDURE — 30233N1 TRANSFUSION OF NONAUTOLOGOUS RED BLOOD CELLS INTO PERIPHERAL VEIN, PERCUTANEOUS APPROACH: ICD-10-PCS | Performed by: INTERNAL MEDICINE

## 2020-12-16 PROCEDURE — P9016 RBC LEUKOCYTES REDUCED: HCPCS

## 2021-01-13 LAB
BASOPHILS # BLD AUTO: 0.01 K/MM3 (ref 0–0.23)
BASOPHILS NFR BLD AUTO: 1 % (ref 0–2)
DEPRECATED RDW RBC AUTO: 74.5 FL (ref 35.1–46.3)
EOSINOPHIL # BLD AUTO: 0.03 K/MM3 (ref 0–0.68)
EOSINOPHIL NFR BLD AUTO: 2 % (ref 0–6)
ERYTHROCYTE [DISTWIDTH] IN BLOOD BY AUTOMATED COUNT: 20.2 % (ref 11.7–14.2)
HCT VFR BLD AUTO: 34.3 % (ref 33–51)
HGB BLD-MCNC: 11.2 G/DL (ref 11.5–16)
IMM GRANULOCYTES # BLD AUTO: 0.01 K/MM3 (ref 0–0.1)
IMM GRANULOCYTES NFR BLD AUTO: 1 % (ref 0–1)
LYMPHOCYTES # BLD AUTO: 0.49 K/MM3 (ref 0.84–5.2)
LYMPHOCYTES NFR BLD AUTO: 38 % (ref 21–46)
MCHC RBC AUTO-ENTMCNC: 32.7 G/DL (ref 31.5–36.5)
MCV RBC AUTO: 106 FL (ref 80–100)
MONOCYTES # BLD AUTO: 0.25 K/MM3 (ref 0.16–1.47)
MONOCYTES NFR BLD AUTO: 19 % (ref 4–13)
NEUTROPHILS # BLD AUTO: 0.51 K/MM3 (ref 1.96–9.15)
NEUTROPHILS NFR BLD AUTO: 39 % (ref 41–73)
NRBC # BLD AUTO: 0 K/MM3 (ref 0–0.02)
NRBC BLD AUTO-RTO: 0 /100 WBC (ref 0–0.2)
PLATELET # BLD AUTO: 36 K/MM3 (ref 150–400)

## 2021-01-14 ENCOUNTER — HOSPITAL ENCOUNTER (OUTPATIENT)
Dept: HOSPITAL 95 - ATC | Age: 36
Discharge: HOME | End: 2021-01-14
Attending: INTERNAL MEDICINE
Payer: COMMERCIAL

## 2021-01-14 DIAGNOSIS — Z91.048: ICD-10-CM

## 2021-01-14 DIAGNOSIS — D89.832: ICD-10-CM

## 2021-01-14 DIAGNOSIS — F32.9: ICD-10-CM

## 2021-01-14 DIAGNOSIS — C91.02: ICD-10-CM

## 2021-01-14 DIAGNOSIS — G62.9: ICD-10-CM

## 2021-01-14 DIAGNOSIS — Z79.899: ICD-10-CM

## 2021-01-14 DIAGNOSIS — D61.818: Primary | ICD-10-CM

## 2021-01-14 PROCEDURE — A9270 NON-COVERED ITEM OR SERVICE: HCPCS

## 2021-01-14 PROCEDURE — P9037 PLATE PHERES LEUKOREDU IRRAD: HCPCS

## 2021-01-14 PROCEDURE — 30233R1 TRANSFUSION OF NONAUTOLOGOUS PLATELETS INTO PERIPHERAL VEIN, PERCUTANEOUS APPROACH: ICD-10-PCS | Performed by: INTERNAL MEDICINE

## 2021-01-14 NOTE — NUR
SPOKE WITH KYREE AT DR. ELLIS'S OFFICE TO NOTIFY THEM OF CHILLS, AND TEMP
INCREASE.  TYLENOL 650 MG PO GIVEN PRIOR TO SECOND UNIT OF PLATLETS.  NEW IV
PLACED PER JAYNE GOTTI RN USING ULTRASOUND AS FIRST IV BECAME PAINFUL AFTER PT
BENT HER ARM GOING TO THE BATHROOM.

## 2021-05-17 LAB
BASOPHILS NFR BLD: 0 % (ref 0–2)
DEPRECATED RDW RBC AUTO: 47.8 FL (ref 35.1–46.3)
EOSINOPHIL NFR BLD: 0 % (ref 0–6)
ERYTHROCYTE [DISTWIDTH] IN BLOOD BY AUTOMATED COUNT: 14.6 % (ref 11.7–14.2)
HCT VFR BLD AUTO: 21.4 % (ref 33–51)
HGB BLD-MCNC: 7.3 G/DL (ref 11.5–16)
IMM GRANULOCYTES # BLD AUTO: 0.01 K/MM3 (ref 0–0.1)
IMM GRANULOCYTES NFR BLD AUTO: 2 % (ref 0–1)
LYMPHOCYTES NFR BLD: 62 % (ref 21–46)
MCHC RBC AUTO-ENTMCNC: 34.1 G/DL (ref 31.5–36.5)
MCV RBC AUTO: 92 FL (ref 80–100)
MONOCYTES NFR BLD: 2 % (ref 4–13)
NEUTS SEG # BLD MANUAL: 0.2 K/MM3 (ref 1.96–9.15)
NEUTS SEG NFR BLD MANUAL: 36 % (ref 41–73)
NRBC # BLD AUTO: 0 K/MM3 (ref 0–0.02)
NRBC BLD AUTO-RTO: 0 /100 WBC (ref 0–0.2)
PLATELET # BLD AUTO: 7 K/MM3 (ref 150–400)
TOTAL CELLS COUNTED BLD: 50

## 2021-05-18 ENCOUNTER — HOSPITAL ENCOUNTER (EMERGENCY)
Dept: HOSPITAL 95 - ER | Age: 36
Discharge: LEFT BEFORE DISCHARGE | End: 2021-05-18
Payer: COMMERCIAL

## 2021-05-18 ENCOUNTER — HOSPITAL ENCOUNTER (OUTPATIENT)
Dept: HOSPITAL 95 - ATC | Age: 36
Discharge: HOME | End: 2021-05-18
Payer: COMMERCIAL

## 2021-05-18 VITALS — WEIGHT: 103 LBS | BODY MASS INDEX: 17.58 KG/M2 | HEIGHT: 64 IN

## 2021-05-18 DIAGNOSIS — H57.10: ICD-10-CM

## 2021-05-18 DIAGNOSIS — D61.818: Primary | ICD-10-CM

## 2021-05-18 DIAGNOSIS — C91.02: ICD-10-CM

## 2021-05-18 DIAGNOSIS — R51.9: Primary | ICD-10-CM

## 2021-05-18 DIAGNOSIS — Z53.21: ICD-10-CM

## 2021-05-18 PROCEDURE — P9035 PLATELET PHERES LEUKOREDUCED: HCPCS

## 2021-05-18 PROCEDURE — A9270 NON-COVERED ITEM OR SERVICE: HCPCS

## 2021-05-18 NOTE — NUR
PLATLETS WERE STARTED AND VERIFIED BY JOLENE RN AND LETA DIAZ, HOWEVER THE
COMPUTER MUST OF NOT FILED AND STARTED UNIT. UNIT WAS STARTED AT 1355 AND
ENDED AT 1521 FOR VERIFICATION VITALS MAINTAINED T/O INFUSION

## 2021-05-19 ENCOUNTER — HOSPITAL ENCOUNTER (OUTPATIENT)
Dept: HOSPITAL 95 - ATC | Age: 36
Discharge: HOME | End: 2021-05-19
Attending: NURSE PRACTITIONER
Payer: COMMERCIAL

## 2021-05-19 DIAGNOSIS — D64.9: Primary | ICD-10-CM

## 2021-05-19 DIAGNOSIS — C91.02: ICD-10-CM

## 2021-05-19 PROCEDURE — A9270 NON-COVERED ITEM OR SERVICE: HCPCS

## 2021-05-19 PROCEDURE — P9016 RBC LEUKOCYTES REDUCED: HCPCS

## 2021-05-21 ENCOUNTER — HOSPITAL ENCOUNTER (OUTPATIENT)
Dept: HOSPITAL 95 - ATC | Age: 36
Discharge: HOME | End: 2021-05-21
Attending: INTERNAL MEDICINE
Payer: COMMERCIAL

## 2021-05-21 DIAGNOSIS — C91.02: Primary | ICD-10-CM

## 2021-05-21 LAB
BASOPHILS # BLD: 0 K/MM3 (ref 0–0.23)
BASOPHILS NFR BLD: 0 % (ref 0–2)
DEPRECATED RDW RBC AUTO: 47.4 FL (ref 35.1–46.3)
EOSINOPHIL # BLD: 0.01 K/MM3 (ref 0–0.68)
EOSINOPHIL NFR BLD: 2 % (ref 0–6)
ERYTHROCYTE [DISTWIDTH] IN BLOOD BY AUTOMATED COUNT: 15.3 % (ref 11.7–14.2)
HCT VFR BLD AUTO: 23 % (ref 33–51)
HGB BLD-MCNC: 8 G/DL (ref 11.5–16)
LYMPHOCYTES # BLD: 0.33 K/MM3 (ref 0.84–5.2)
LYMPHOCYTES NFR BLD: 40 % (ref 21–46)
MCHC RBC AUTO-ENTMCNC: 34.8 G/DL (ref 31.5–36.5)
MCV RBC AUTO: 88 FL (ref 80–100)
MONOCYTES # BLD: 0 K/MM3 (ref 0.16–1.47)
MONOCYTES NFR BLD: 1 % (ref 4–13)
MYELOCYTES # BLD MANUAL: 0 K/MM3 (ref 0–0)
MYELOCYTES NFR BLD MANUAL: 1 % (ref 0–0)
NEUTS SEG # BLD MANUAL: 0.47 K/MM3 (ref 1.96–9.15)
NEUTS SEG NFR BLD MANUAL: 56 % (ref 41–73)
NRBC # BLD AUTO: 0 K/MM3 (ref 0–0.02)
NRBC BLD AUTO-RTO: 0 /100 WBC (ref 0–0.2)
PLATELET # BLD AUTO: 20 K/MM3 (ref 150–400)
TOTAL CELLS COUNTED BLD: 100

## 2021-05-21 PROCEDURE — A9270 NON-COVERED ITEM OR SERVICE: HCPCS

## 2021-05-21 PROCEDURE — P9016 RBC LEUKOCYTES REDUCED: HCPCS

## 2021-05-21 PROCEDURE — P9035 PLATELET PHERES LEUKOREDUCED: HCPCS

## 2021-05-24 ENCOUNTER — HOSPITAL ENCOUNTER (OUTPATIENT)
Dept: HOSPITAL 95 - ATC | Age: 36
Discharge: HOME | End: 2021-05-24
Attending: INTERNAL MEDICINE
Payer: COMMERCIAL

## 2021-05-24 DIAGNOSIS — D61.818: Primary | ICD-10-CM

## 2021-05-24 DIAGNOSIS — C91.02: ICD-10-CM

## 2021-05-24 LAB
BASOPHILS # BLD: 0 K/MM3 (ref 0–0.23)
BASOPHILS NFR BLD: 0 % (ref 0–2)
DEPRECATED RDW RBC AUTO: 46.1 FL (ref 35.1–46.3)
EOSINOPHIL # BLD: 0 K/MM3 (ref 0–0.68)
EOSINOPHIL NFR BLD: 0 % (ref 0–6)
ERYTHROCYTE [DISTWIDTH] IN BLOOD BY AUTOMATED COUNT: 15 % (ref 11.7–14.2)
HCT VFR BLD AUTO: 24 % (ref 33–51)
HGB BLD-MCNC: 8 G/DL (ref 11.5–16)
LYMPHOCYTES # BLD: 0.41 K/MM3 (ref 0.84–5.2)
LYMPHOCYTES NFR BLD: 66 % (ref 21–46)
MCHC RBC AUTO-ENTMCNC: 33.3 G/DL (ref 31.5–36.5)
MCV RBC AUTO: 92 FL (ref 80–100)
MONOCYTES # BLD: 0.05 K/MM3 (ref 0.16–1.47)
MONOCYTES NFR BLD: 8 % (ref 4–13)
NEUTS SEG # BLD MANUAL: 0.16 K/MM3 (ref 1.96–9.15)
NEUTS SEG NFR BLD MANUAL: 26 % (ref 41–73)
NRBC # BLD AUTO: 0 K/MM3 (ref 0–0.02)
NRBC BLD AUTO-RTO: 0 /100 WBC (ref 0–0.2)
PLATELET # BLD AUTO: 10 K/MM3 (ref 150–400)
TOTAL CELLS COUNTED BLD: 50

## 2021-05-24 PROCEDURE — A9270 NON-COVERED ITEM OR SERVICE: HCPCS

## 2021-05-24 PROCEDURE — P9040 RBC LEUKOREDUCED IRRADIATED: HCPCS

## 2021-05-24 PROCEDURE — P9037 PLATE PHERES LEUKOREDU IRRAD: HCPCS

## 2021-05-27 ENCOUNTER — HOSPITAL ENCOUNTER (OUTPATIENT)
Dept: HOSPITAL 95 - ATC | Age: 36
Discharge: HOME | End: 2021-05-27
Attending: INTERNAL MEDICINE
Payer: COMMERCIAL

## 2021-05-27 DIAGNOSIS — C91.02: Primary | ICD-10-CM

## 2021-05-27 DIAGNOSIS — D61.818: ICD-10-CM

## 2021-05-27 LAB
BASOPHILS # BLD: 0.02 K/MM3 (ref 0–0.23)
BASOPHILS NFR BLD: 3 % (ref 0–2)
DEPRECATED RDW RBC AUTO: 46.1 FL (ref 35.1–46.3)
EOSINOPHIL # BLD: 0 K/MM3 (ref 0–0.68)
EOSINOPHIL NFR BLD: 1 % (ref 0–6)
ERYTHROCYTE [DISTWIDTH] IN BLOOD BY AUTOMATED COUNT: 15.8 % (ref 11.7–14.2)
HCT VFR BLD AUTO: 23.7 % (ref 33–51)
HGB BLD-MCNC: 7.9 G/DL (ref 11.5–16)
LYMPHOCYTES # BLD: 0.43 K/MM3 (ref 0.84–5.2)
LYMPHOCYTES NFR BLD: 57 % (ref 21–46)
MCHC RBC AUTO-ENTMCNC: 33.3 G/DL (ref 31.5–36.5)
MCV RBC AUTO: 93 FL (ref 80–100)
MONOCYTES # BLD: 0.06 K/MM3 (ref 0.16–1.47)
MONOCYTES NFR BLD: 9 % (ref 4–13)
NEUTS SEG # BLD MANUAL: 0.23 K/MM3 (ref 1.96–9.15)
NEUTS SEG NFR BLD MANUAL: 30 % (ref 41–73)
NRBC # BLD AUTO: 0.02 K/MM3 (ref 0–0.02)
NRBC BLD AUTO-RTO: 2.6 /100 WBC (ref 0–0.2)
PLATELET # BLD AUTO: 10 K/MM3 (ref 150–400)
TOTAL CELLS COUNTED BLD: 100

## 2021-05-27 PROCEDURE — P9040 RBC LEUKOREDUCED IRRADIATED: HCPCS

## 2021-05-27 PROCEDURE — A9270 NON-COVERED ITEM OR SERVICE: HCPCS

## 2021-05-27 PROCEDURE — P9037 PLATE PHERES LEUKOREDU IRRAD: HCPCS

## 2021-05-31 ENCOUNTER — HOSPITAL ENCOUNTER (EMERGENCY)
Dept: HOSPITAL 95 - ER | Age: 36
Discharge: HOME | End: 2021-05-31
Payer: COMMERCIAL

## 2021-05-31 VITALS — HEIGHT: 63 IN | WEIGHT: 105.01 LBS | BODY MASS INDEX: 18.61 KG/M2

## 2021-05-31 DIAGNOSIS — Z88.8: ICD-10-CM

## 2021-05-31 DIAGNOSIS — Z91.09: ICD-10-CM

## 2021-05-31 DIAGNOSIS — Z79.899: ICD-10-CM

## 2021-05-31 DIAGNOSIS — D61.818: Primary | ICD-10-CM

## 2021-05-31 DIAGNOSIS — K21.9: ICD-10-CM

## 2021-05-31 PROCEDURE — P9040 RBC LEUKOREDUCED IRRADIATED: HCPCS

## 2021-05-31 PROCEDURE — P9035 PLATELET PHERES LEUKOREDUCED: HCPCS

## 2021-05-31 PROCEDURE — A9270 NON-COVERED ITEM OR SERVICE: HCPCS

## 2021-06-03 LAB
BASOPHILS # BLD AUTO: 0 K/MM3 (ref 0–0.23)
BASOPHILS NFR BLD AUTO: 0 % (ref 0–2)
DEPRECATED RDW RBC AUTO: 47.8 FL (ref 35.1–46.3)
EOSINOPHIL # BLD AUTO: 0.01 K/MM3 (ref 0–0.68)
EOSINOPHIL NFR BLD AUTO: 1 % (ref 0–6)
ERYTHROCYTE [DISTWIDTH] IN BLOOD BY AUTOMATED COUNT: 17.2 % (ref 11.7–14.2)
HCT VFR BLD AUTO: 25.5 % (ref 33–51)
HGB BLD-MCNC: 8.4 G/DL (ref 11.5–16)
IMM GRANULOCYTES # BLD AUTO: 0.1 K/MM3 (ref 0–0.1)
IMM GRANULOCYTES NFR BLD AUTO: 6 % (ref 0–1)
LYMPHOCYTES # BLD AUTO: 0.52 K/MM3 (ref 0.84–5.2)
LYMPHOCYTES NFR BLD AUTO: 32 % (ref 21–46)
MCHC RBC AUTO-ENTMCNC: 32.9 G/DL (ref 31.5–36.5)
MCV RBC AUTO: 95 FL (ref 80–100)
MONOCYTES # BLD AUTO: 0.16 K/MM3 (ref 0.16–1.47)
MONOCYTES NFR BLD AUTO: 10 % (ref 4–13)
NEUTROPHILS # BLD AUTO: 0.85 K/MM3 (ref 1.96–9.15)
NEUTROPHILS NFR BLD AUTO: 52 % (ref 41–73)
NRBC # BLD AUTO: 0 K/MM3 (ref 0–0.02)
NRBC BLD AUTO-RTO: 0 /100 WBC (ref 0–0.2)
PLATELET # BLD AUTO: 8 K/MM3 (ref 150–400)

## 2021-06-04 ENCOUNTER — HOSPITAL ENCOUNTER (OUTPATIENT)
Dept: HOSPITAL 95 - ATC | Age: 36
Discharge: HOME | End: 2021-06-04
Attending: INTERNAL MEDICINE
Payer: COMMERCIAL

## 2021-06-04 DIAGNOSIS — D61.818: Primary | ICD-10-CM

## 2021-06-04 DIAGNOSIS — C91.02: ICD-10-CM

## 2021-06-04 PROCEDURE — P9040 RBC LEUKOREDUCED IRRADIATED: HCPCS

## 2021-06-04 PROCEDURE — A9270 NON-COVERED ITEM OR SERVICE: HCPCS

## 2021-06-04 PROCEDURE — P9037 PLATE PHERES LEUKOREDU IRRAD: HCPCS

## 2021-06-10 LAB
BASOPHILS # BLD: 0 K/MM3 (ref 0–0.23)
BASOPHILS NFR BLD: 0 % (ref 0–2)
DEPRECATED RDW RBC AUTO: 48.3 FL (ref 35.1–46.3)
EOSINOPHIL # BLD: 0 K/MM3 (ref 0–0.68)
EOSINOPHIL NFR BLD: 0 % (ref 0–6)
ERYTHROCYTE [DISTWIDTH] IN BLOOD BY AUTOMATED COUNT: 16.4 % (ref 11.7–14.2)
HCT VFR BLD AUTO: 22.8 % (ref 33–51)
HGB BLD-MCNC: 7.5 G/DL (ref 11.5–16)
LYMPHOCYTES # BLD: 0.43 K/MM3 (ref 0.84–5.2)
LYMPHOCYTES NFR BLD: 27 % (ref 21–46)
MCHC RBC AUTO-ENTMCNC: 32.9 G/DL (ref 31.5–36.5)
MCV RBC AUTO: 95 FL (ref 80–100)
MONOCYTES # BLD: 0.16 K/MM3 (ref 0.16–1.47)
MONOCYTES NFR BLD: 10 % (ref 4–13)
MYELOCYTES # BLD MANUAL: 0.01 K/MM3 (ref 0–0)
MYELOCYTES NFR BLD MANUAL: 1 % (ref 0–0)
NEUTS BAND NFR BLD MANUAL: 2 % (ref 0–8)
NEUTS SEG # BLD MANUAL: 0.99 K/MM3 (ref 1.96–9.15)
NEUTS SEG NFR BLD MANUAL: 60 % (ref 41–73)
NRBC # BLD AUTO: 0.03 K/MM3 (ref 0–0.02)
NRBC BLD AUTO-RTO: 1.9 /100 WBC (ref 0–0.2)
PLATELET # BLD AUTO: 5 K/MM3 (ref 150–400)
TOTAL CELLS COUNTED BLD: 100

## 2021-06-11 ENCOUNTER — HOSPITAL ENCOUNTER (OUTPATIENT)
Dept: HOSPITAL 95 - ATC | Age: 36
Discharge: HOME | End: 2021-06-11
Attending: INTERNAL MEDICINE
Payer: COMMERCIAL

## 2021-06-11 DIAGNOSIS — C92.02: Primary | ICD-10-CM

## 2021-06-11 DIAGNOSIS — F32.9: ICD-10-CM

## 2021-06-11 DIAGNOSIS — K21.9: ICD-10-CM

## 2021-06-11 PROCEDURE — A9270 NON-COVERED ITEM OR SERVICE: HCPCS

## 2021-06-11 PROCEDURE — P9037 PLATE PHERES LEUKOREDU IRRAD: HCPCS

## 2021-06-11 PROCEDURE — P9040 RBC LEUKOREDUCED IRRADIATED: HCPCS

## 2021-06-13 ENCOUNTER — HOSPITAL ENCOUNTER (EMERGENCY)
Dept: HOSPITAL 95 - ER | Age: 36
Discharge: HOME | End: 2021-06-13
Payer: COMMERCIAL

## 2021-06-13 VITALS — BODY MASS INDEX: 18.44 KG/M2 | WEIGHT: 108 LBS | HEIGHT: 64 IN

## 2021-06-13 DIAGNOSIS — Z79.899: ICD-10-CM

## 2021-06-13 DIAGNOSIS — D63.0: Primary | ICD-10-CM

## 2021-06-13 DIAGNOSIS — C95.90: ICD-10-CM

## 2021-06-13 LAB
BASOPHILS # BLD AUTO: 0.01 K/MM3 (ref 0–0.23)
BASOPHILS NFR BLD AUTO: 1 % (ref 0–2)
DEPRECATED RDW RBC AUTO: 59.9 FL (ref 35.1–46.3)
EOSINOPHIL # BLD AUTO: 0.01 K/MM3 (ref 0–0.68)
EOSINOPHIL NFR BLD AUTO: 1 % (ref 0–6)
ERYTHROCYTE [DISTWIDTH] IN BLOOD BY AUTOMATED COUNT: 20.5 % (ref 11.7–14.2)
HCT VFR BLD AUTO: 18.7 % (ref 33–51)
HGB BLD-MCNC: 6 G/DL (ref 11.5–16)
IMM GRANULOCYTES # BLD AUTO: 0 K/MM3 (ref 0–0.1)
IMM GRANULOCYTES NFR BLD AUTO: 0 % (ref 0–1)
LYMPHOCYTES # BLD AUTO: 0.43 K/MM3 (ref 0.84–5.2)
LYMPHOCYTES NFR BLD AUTO: 49 % (ref 21–46)
MCHC RBC AUTO-ENTMCNC: 32.1 G/DL (ref 31.5–36.5)
MCV RBC AUTO: 94 FL (ref 80–100)
MONOCYTES # BLD AUTO: 0.18 K/MM3 (ref 0.16–1.47)
MONOCYTES NFR BLD AUTO: 21 % (ref 4–13)
NEUTROPHILS # BLD AUTO: 0.25 K/MM3 (ref 1.96–9.15)
NEUTROPHILS NFR BLD AUTO: 28 % (ref 41–73)
NRBC # BLD AUTO: 0 K/MM3 (ref 0–0.02)
NRBC BLD AUTO-RTO: 0 /100 WBC (ref 0–0.2)
PLATELET # BLD AUTO: 13 K/MM3 (ref 150–400)

## 2021-06-13 PROCEDURE — P9016 RBC LEUKOCYTES REDUCED: HCPCS

## 2021-06-14 ENCOUNTER — HOSPITAL ENCOUNTER (OUTPATIENT)
Dept: HOSPITAL 95 - ATC | Age: 36
Discharge: HOME | End: 2021-06-14
Attending: INTERNAL MEDICINE
Payer: COMMERCIAL

## 2021-06-14 DIAGNOSIS — F32.9: ICD-10-CM

## 2021-06-14 DIAGNOSIS — K21.9: ICD-10-CM

## 2021-06-14 DIAGNOSIS — D61.818: Primary | ICD-10-CM

## 2021-06-14 DIAGNOSIS — C91.01: ICD-10-CM

## 2021-06-14 LAB
BASOPHILS # BLD AUTO: 0.01 K/MM3 (ref 0–0.23)
BASOPHILS NFR BLD AUTO: 1 % (ref 0–2)
DEPRECATED RDW RBC AUTO: 49.4 FL (ref 35.1–46.3)
EOSINOPHIL # BLD AUTO: 0.02 K/MM3 (ref 0–0.68)
EOSINOPHIL NFR BLD AUTO: 2 % (ref 0–6)
ERYTHROCYTE [DISTWIDTH] IN BLOOD BY AUTOMATED COUNT: 17.9 % (ref 11.7–14.2)
HCT VFR BLD AUTO: 31.4 % (ref 33–51)
HGB BLD-MCNC: 10.7 G/DL (ref 11.5–16)
IMM GRANULOCYTES # BLD AUTO: 0.01 K/MM3 (ref 0–0.1)
IMM GRANULOCYTES NFR BLD AUTO: 1 % (ref 0–1)
LYMPHOCYTES # BLD AUTO: 0.7 K/MM3 (ref 0.84–5.2)
LYMPHOCYTES NFR BLD AUTO: 55 % (ref 21–46)
MCHC RBC AUTO-ENTMCNC: 34.1 G/DL (ref 31.5–36.5)
MCV RBC AUTO: 91 FL (ref 80–100)
MONOCYTES # BLD AUTO: 0.19 K/MM3 (ref 0.16–1.47)
MONOCYTES NFR BLD AUTO: 15 % (ref 4–13)
NEUTROPHILS # BLD AUTO: 0.35 K/MM3 (ref 1.96–9.15)
NEUTROPHILS NFR BLD AUTO: 27 % (ref 41–73)
NRBC # BLD AUTO: 0.02 K/MM3 (ref 0–0.02)
NRBC BLD AUTO-RTO: 1.6 /100 WBC (ref 0–0.2)
PLATELET # BLD AUTO: 7 K/MM3 (ref 150–400)

## 2021-06-14 PROCEDURE — P9037 PLATE PHERES LEUKOREDU IRRAD: HCPCS

## 2021-06-14 PROCEDURE — A9270 NON-COVERED ITEM OR SERVICE: HCPCS

## 2021-06-17 LAB
BASOPHILS # BLD AUTO: 0.01 K/MM3 (ref 0–0.23)
BASOPHILS NFR BLD AUTO: 1 % (ref 0–2)
DEPRECATED RDW RBC AUTO: 50.6 FL (ref 35.1–46.3)
EOSINOPHIL # BLD AUTO: 0.01 K/MM3 (ref 0–0.68)
EOSINOPHIL NFR BLD AUTO: 1 % (ref 0–6)
ERYTHROCYTE [DISTWIDTH] IN BLOOD BY AUTOMATED COUNT: 17.9 % (ref 11.7–14.2)
HCT VFR BLD AUTO: 25.4 % (ref 33–51)
HGB BLD-MCNC: 8.4 G/DL (ref 11.5–16)
IMM GRANULOCYTES # BLD AUTO: 0.03 K/MM3 (ref 0–0.1)
IMM GRANULOCYTES NFR BLD AUTO: 2 % (ref 0–1)
LYMPHOCYTES # BLD AUTO: 0.6 K/MM3 (ref 0.84–5.2)
LYMPHOCYTES NFR BLD AUTO: 46 % (ref 21–46)
MCHC RBC AUTO-ENTMCNC: 33.1 G/DL (ref 31.5–36.5)
MCV RBC AUTO: 96 FL (ref 80–100)
MONOCYTES # BLD AUTO: 0.13 K/MM3 (ref 0.16–1.47)
MONOCYTES NFR BLD AUTO: 10 % (ref 4–13)
NEUTROPHILS # BLD AUTO: 0.52 K/MM3 (ref 1.96–9.15)
NEUTROPHILS NFR BLD AUTO: 40 % (ref 41–73)
NRBC # BLD AUTO: 0.03 K/MM3 (ref 0–0.02)
NRBC BLD AUTO-RTO: 2.3 /100 WBC (ref 0–0.2)
PLATELET # BLD AUTO: 14 K/MM3 (ref 150–400)

## 2021-06-18 ENCOUNTER — HOSPITAL ENCOUNTER (OUTPATIENT)
Dept: HOSPITAL 95 - ATC | Age: 36
Discharge: HOME | End: 2021-06-18
Attending: INTERNAL MEDICINE
Payer: COMMERCIAL

## 2021-06-18 DIAGNOSIS — Z91.048: ICD-10-CM

## 2021-06-18 DIAGNOSIS — D61.818: ICD-10-CM

## 2021-06-18 DIAGNOSIS — Z88.8: ICD-10-CM

## 2021-06-18 DIAGNOSIS — C91.02: Primary | ICD-10-CM

## 2021-06-18 PROCEDURE — P9040 RBC LEUKOREDUCED IRRADIATED: HCPCS

## 2021-06-18 PROCEDURE — A9270 NON-COVERED ITEM OR SERVICE: HCPCS

## 2021-06-18 PROCEDURE — P9037 PLATE PHERES LEUKOREDU IRRAD: HCPCS

## 2021-06-21 ENCOUNTER — HOSPITAL ENCOUNTER (OUTPATIENT)
Dept: HOSPITAL 95 - ATC | Age: 36
Discharge: HOME | End: 2021-06-21
Attending: INTERNAL MEDICINE
Payer: COMMERCIAL

## 2021-06-21 DIAGNOSIS — C92.02: Primary | ICD-10-CM

## 2021-06-21 DIAGNOSIS — Z88.8: ICD-10-CM

## 2021-06-21 LAB
BASOPHILS # BLD: 0 K/MM3 (ref 0–0.23)
BASOPHILS NFR BLD: 0 % (ref 0–2)
DEPRECATED RDW RBC AUTO: 51.5 FL (ref 35.1–46.3)
EOSINOPHIL # BLD: 0.02 K/MM3 (ref 0–0.68)
EOSINOPHIL NFR BLD: 2 % (ref 0–6)
ERYTHROCYTE [DISTWIDTH] IN BLOOD BY AUTOMATED COUNT: 19.9 % (ref 11.7–14.2)
HCT VFR BLD AUTO: 19.9 % (ref 33–51)
HGB BLD-MCNC: 6.6 G/DL (ref 11.5–16)
LYMPHOCYTES # BLD: 0.57 K/MM3 (ref 0.84–5.2)
LYMPHOCYTES NFR BLD: 48 % (ref 21–46)
MCHC RBC AUTO-ENTMCNC: 33.2 G/DL (ref 31.5–36.5)
MCV RBC AUTO: 97 FL (ref 80–100)
MONOCYTES # BLD: 0.07 K/MM3 (ref 0.16–1.47)
MONOCYTES NFR BLD: 6 % (ref 4–13)
MYELOCYTES # BLD MANUAL: 0.02 K/MM3 (ref 0–0)
MYELOCYTES NFR BLD MANUAL: 2 % (ref 0–0)
NEUTS SEG # BLD MANUAL: 0.5 K/MM3 (ref 1.96–9.15)
NEUTS SEG NFR BLD MANUAL: 42 % (ref 41–73)
NRBC # BLD AUTO: 0.02 K/MM3 (ref 0–0.02)
NRBC BLD AUTO-RTO: 1.7 /100 WBC (ref 0–0.2)
PLATELET # BLD AUTO: 7 K/MM3 (ref 150–400)
TOTAL CELLS COUNTED BLD: 50

## 2021-06-21 PROCEDURE — P9040 RBC LEUKOREDUCED IRRADIATED: HCPCS

## 2021-06-21 PROCEDURE — A9270 NON-COVERED ITEM OR SERVICE: HCPCS

## 2021-06-21 PROCEDURE — P9037 PLATE PHERES LEUKOREDU IRRAD: HCPCS

## 2021-06-24 LAB
BASOPHILS # BLD: 0.01 K/MM3 (ref 0–0.23)
BASOPHILS NFR BLD: 1 % (ref 0–2)
DEPRECATED RDW RBC AUTO: 50.1 FL (ref 35.1–46.3)
EOSINOPHIL # BLD: 0 K/MM3 (ref 0–0.68)
EOSINOPHIL NFR BLD: 0 % (ref 0–6)
ERYTHROCYTE [DISTWIDTH] IN BLOOD BY AUTOMATED COUNT: 19.9 % (ref 11.7–14.2)
HCT VFR BLD AUTO: 25.7 % (ref 33–51)
HGB BLD-MCNC: 8.4 G/DL (ref 11.5–16)
LYMPHOCYTES # BLD: 0.52 K/MM3 (ref 0.84–5.2)
LYMPHOCYTES NFR BLD: 42 % (ref 21–46)
MCHC RBC AUTO-ENTMCNC: 32.7 G/DL (ref 31.5–36.5)
MCV RBC AUTO: 93 FL (ref 80–100)
MONOCYTES # BLD: 0.17 K/MM3 (ref 0.16–1.47)
MONOCYTES NFR BLD: 14 % (ref 4–13)
NEUTS SEG # BLD MANUAL: 0.54 K/MM3 (ref 1.96–9.15)
NEUTS SEG NFR BLD MANUAL: 43 % (ref 41–73)
NRBC # BLD AUTO: 0 K/MM3 (ref 0–0.02)
NRBC BLD AUTO-RTO: 0 /100 WBC (ref 0–0.2)
PLATELET # BLD AUTO: 7 K/MM3 (ref 150–400)
TOTAL CELLS COUNTED BLD: 100

## 2021-06-25 ENCOUNTER — HOSPITAL ENCOUNTER (OUTPATIENT)
Dept: HOSPITAL 95 - ATC | Age: 36
Discharge: HOME | End: 2021-06-25
Attending: INTERNAL MEDICINE
Payer: COMMERCIAL

## 2021-06-25 DIAGNOSIS — D64.81: ICD-10-CM

## 2021-06-25 DIAGNOSIS — C91.02: Primary | ICD-10-CM

## 2021-06-25 DIAGNOSIS — Z91.09: ICD-10-CM

## 2021-06-25 DIAGNOSIS — Z88.8: ICD-10-CM

## 2021-06-25 DIAGNOSIS — Z94.81: ICD-10-CM

## 2021-06-25 DIAGNOSIS — D61.818: ICD-10-CM

## 2021-06-25 DIAGNOSIS — Z92.21: ICD-10-CM

## 2021-06-25 PROCEDURE — P9040 RBC LEUKOREDUCED IRRADIATED: HCPCS

## 2021-06-25 PROCEDURE — P9037 PLATE PHERES LEUKOREDU IRRAD: HCPCS

## 2021-06-25 PROCEDURE — A9270 NON-COVERED ITEM OR SERVICE: HCPCS

## 2021-06-28 ENCOUNTER — HOSPITAL ENCOUNTER (OUTPATIENT)
Dept: HOSPITAL 95 - ATC | Age: 36
Discharge: HOME | End: 2021-06-28
Attending: INTERNAL MEDICINE
Payer: COMMERCIAL

## 2021-06-28 DIAGNOSIS — D64.81: ICD-10-CM

## 2021-06-28 DIAGNOSIS — F10.239: ICD-10-CM

## 2021-06-28 DIAGNOSIS — C91.02: Primary | ICD-10-CM

## 2021-06-28 DIAGNOSIS — T45.1X5A: ICD-10-CM

## 2021-06-28 LAB
BASOPHILS # BLD: 0 K/MM3 (ref 0–0.23)
BASOPHILS NFR BLD: 0 % (ref 0–2)
DEPRECATED RDW RBC AUTO: 50.4 FL (ref 35.1–46.3)
EOSINOPHIL # BLD: 0 K/MM3 (ref 0–0.68)
EOSINOPHIL NFR BLD: 0 % (ref 0–6)
ERYTHROCYTE [DISTWIDTH] IN BLOOD BY AUTOMATED COUNT: 20 % (ref 11.7–14.2)
HCT VFR BLD AUTO: 26.4 % (ref 33–51)
HGB BLD-MCNC: 8.4 G/DL (ref 11.5–16)
LYMPHOCYTES # BLD: 0.48 K/MM3 (ref 0.84–5.2)
LYMPHOCYTES NFR BLD: 46 % (ref 21–46)
MCHC RBC AUTO-ENTMCNC: 31.8 G/DL (ref 31.5–36.5)
MCV RBC AUTO: 93 FL (ref 80–100)
METAMYELOCYTES # BLD MANUAL: 0.01 K/MM3 (ref 0–0)
METAMYELOCYTES NFR BLD MANUAL: 1 % (ref 0–0)
MONOCYTES # BLD: 0.06 K/MM3 (ref 0.16–1.47)
MONOCYTES NFR BLD: 6 % (ref 4–13)
NEUTS SEG # BLD MANUAL: 0.49 K/MM3 (ref 1.96–9.15)
NEUTS SEG NFR BLD MANUAL: 47 % (ref 41–73)
NRBC # BLD AUTO: 0 K/MM3 (ref 0–0.02)
NRBC BLD AUTO-RTO: 0 /100 WBC (ref 0–0.2)
PLATELET # BLD AUTO: 8 K/MM3 (ref 150–400)
TOTAL CELLS COUNTED BLD: 100

## 2021-06-28 PROCEDURE — P9035 PLATELET PHERES LEUKOREDUCED: HCPCS

## 2021-07-01 LAB
BASOPHILS # BLD AUTO: 0.01 K/MM3 (ref 0–0.23)
BASOPHILS # BLD: 0 K/MM3 (ref 0–0.23)
BASOPHILS NFR BLD AUTO: 1 % (ref 0–2)
BASOPHILS NFR BLD: 0 % (ref 0–2)
DEPRECATED RDW RBC AUTO: 52.6 FL (ref 35.1–46.3)
EOSINOPHIL # BLD AUTO: 0.02 K/MM3 (ref 0–0.68)
EOSINOPHIL # BLD: 0 K/MM3 (ref 0–0.68)
EOSINOPHIL NFR BLD AUTO: 1 % (ref 0–6)
EOSINOPHIL NFR BLD: 0 % (ref 0–6)
ERYTHROCYTE [DISTWIDTH] IN BLOOD BY AUTOMATED COUNT: 22.6 % (ref 11.7–14.2)
HCT VFR BLD AUTO: 20.6 % (ref 33–51)
HGB BLD-MCNC: 6.4 G/DL (ref 11.5–16)
IMM GRANULOCYTES # BLD AUTO: 0.01 K/MM3 (ref 0–0.1)
IMM GRANULOCYTES NFR BLD AUTO: 1 % (ref 0–1)
LYMPHOCYTES # BLD AUTO: 0.56 K/MM3 (ref 0.84–5.2)
LYMPHOCYTES # BLD: 0.48 K/MM3 (ref 0.84–5.2)
LYMPHOCYTES NFR BLD AUTO: 34 % (ref 21–46)
LYMPHOCYTES NFR BLD: 29 % (ref 21–46)
MCHC RBC AUTO-ENTMCNC: 31.1 G/DL (ref 31.5–36.5)
MCV RBC AUTO: 98 FL (ref 80–100)
MONOCYTES # BLD AUTO: 0.2 K/MM3 (ref 0.16–1.47)
MONOCYTES # BLD: 0.13 K/MM3 (ref 0.16–1.47)
MONOCYTES NFR BLD AUTO: 12 % (ref 4–13)
MONOCYTES NFR BLD: 8 % (ref 4–13)
NEUTROPHILS # BLD AUTO: 0.87 K/MM3 (ref 1.96–9.15)
NEUTROPHILS NFR BLD AUTO: 52 % (ref 41–73)
NEUTS BAND NFR BLD MANUAL: 5 % (ref 0–8)
NEUTS SEG # BLD MANUAL: 1.05 K/MM3 (ref 1.96–9.15)
NEUTS SEG NFR BLD MANUAL: 58 % (ref 41–73)
NRBC # BLD AUTO: 0 K/MM3 (ref 0–0.02)
NRBC BLD AUTO-RTO: 0 /100 WBC (ref 0–0.2)
PLATELET # BLD AUTO: 22 K/MM3 (ref 150–400)
TOTAL CELLS COUNTED BLD: 100

## 2021-07-02 ENCOUNTER — HOSPITAL ENCOUNTER (OUTPATIENT)
Dept: HOSPITAL 95 - ATC | Age: 36
Discharge: HOME | End: 2021-07-02
Attending: INTERNAL MEDICINE
Payer: COMMERCIAL

## 2021-07-02 DIAGNOSIS — Z94.81: ICD-10-CM

## 2021-07-02 DIAGNOSIS — C91.12: Primary | ICD-10-CM

## 2021-07-02 DIAGNOSIS — D61.818: ICD-10-CM

## 2021-07-02 DIAGNOSIS — K21.9: ICD-10-CM

## 2021-07-02 DIAGNOSIS — Z91.048: ICD-10-CM

## 2021-07-02 DIAGNOSIS — Z88.8: ICD-10-CM

## 2021-07-02 PROCEDURE — A9270 NON-COVERED ITEM OR SERVICE: HCPCS

## 2021-07-02 PROCEDURE — P9040 RBC LEUKOREDUCED IRRADIATED: HCPCS

## 2021-07-06 ENCOUNTER — HOSPITAL ENCOUNTER (EMERGENCY)
Dept: HOSPITAL 95 - ER | Age: 36
Discharge: HOME | End: 2021-07-06
Payer: COMMERCIAL

## 2021-07-06 VITALS — WEIGHT: 106.99 LBS | BODY MASS INDEX: 18.27 KG/M2 | HEIGHT: 64 IN

## 2021-07-06 DIAGNOSIS — Z79.899: ICD-10-CM

## 2021-07-06 DIAGNOSIS — K21.9: ICD-10-CM

## 2021-07-06 DIAGNOSIS — Z88.8: ICD-10-CM

## 2021-07-06 DIAGNOSIS — D69.6: ICD-10-CM

## 2021-07-06 DIAGNOSIS — R31.9: Primary | ICD-10-CM

## 2021-07-06 DIAGNOSIS — Z91.09: ICD-10-CM

## 2021-07-06 DIAGNOSIS — D53.9: ICD-10-CM

## 2021-07-06 LAB
ALBUMIN SERPL BCP-MCNC: 2.4 G/DL (ref 3.4–5)
ALBUMIN/GLOB SERPL: 1.1 {RATIO} (ref 0.8–1.8)
ALT SERPL W P-5'-P-CCNC: 19 U/L (ref 12–78)
ANION GAP SERPL CALCULATED.4IONS-SCNC: 4 MMOL/L (ref 6–16)
AST SERPL W P-5'-P-CCNC: 24 U/L (ref 12–37)
BASOPHILS # BLD AUTO: 0.01 K/MM3 (ref 0–0.23)
BASOPHILS # BLD: 0 K/MM3 (ref 0–0.23)
BASOPHILS # BLD: 0.03 K/MM3 (ref 0–0.23)
BASOPHILS NFR BLD AUTO: 1 % (ref 0–2)
BASOPHILS NFR BLD: 0 % (ref 0–2)
BASOPHILS NFR BLD: 2 % (ref 0–2)
BILIRUB SERPL-MCNC: 0.3 MG/DL (ref 0.1–1)
BUN SERPL-MCNC: 13 MG/DL (ref 8–24)
CALCIUM SERPL-MCNC: 8 MG/DL (ref 8.5–10.1)
CHLORIDE SERPL-SCNC: 107 MMOL/L (ref 98–108)
CO2 SERPL-SCNC: 31 MMOL/L (ref 21–32)
CREAT SERPL-MCNC: 0.58 MG/DL (ref 0.4–1)
DEPRECATED RDW RBC AUTO: (no result) FL (ref 35.1–46.3)
EOSINOPHIL # BLD AUTO: 0.01 K/MM3 (ref 0–0.68)
EOSINOPHIL # BLD: 0 K/MM3 (ref 0–0.68)
EOSINOPHIL # BLD: 0.01 K/MM3 (ref 0–0.68)
EOSINOPHIL NFR BLD AUTO: 1 % (ref 0–6)
EOSINOPHIL NFR BLD: 0 % (ref 0–6)
EOSINOPHIL NFR BLD: 1 % (ref 0–6)
ERYTHROCYTE [DISTWIDTH] IN BLOOD BY AUTOMATED COUNT: (no result) % (ref 11.7–14.2)
ERYTHROCYTE [DISTWIDTH] IN BLOOD BY AUTOMATED COUNT: (no result) % (ref 11.7–14.2)
GLOBULIN SER CALC-MCNC: 2.2 G/DL (ref 2.2–4)
GLUCOSE SERPL-MCNC: 97 MG/DL (ref 70–99)
HCT VFR BLD AUTO: 16.1 % (ref 33–51)
HCT VFR BLD AUTO: 18.8 % (ref 33–51)
HGB BLD-MCNC: 5 G/DL (ref 11.5–16)
HGB BLD-MCNC: 5.8 G/DL (ref 11.5–16)
IMM GRANULOCYTES # BLD AUTO: 0.04 K/MM3 (ref 0–0.1)
IMM GRANULOCYTES NFR BLD AUTO: 2 % (ref 0–1)
LYMPHOCYTES # BLD AUTO: 0.5 K/MM3 (ref 0.84–5.2)
LYMPHOCYTES # BLD: 0.46 K/MM3 (ref 0.84–5.2)
LYMPHOCYTES # BLD: 1 K/MM3 (ref 0.84–5.2)
LYMPHOCYTES NFR BLD AUTO: 29 % (ref 21–46)
LYMPHOCYTES NFR BLD: 16 % (ref 21–46)
LYMPHOCYTES NFR BLD: 27 % (ref 21–46)
MCHC RBC AUTO-ENTMCNC: 30.9 G/DL (ref 31.5–36.5)
MCHC RBC AUTO-ENTMCNC: 31.1 G/DL (ref 31.5–36.5)
MCV RBC AUTO: 93 FL (ref 80–100)
MCV RBC AUTO: 96 FL (ref 80–100)
METAMYELOCYTES # BLD MANUAL: 0.06 K/MM3 (ref 0–0)
METAMYELOCYTES NFR BLD MANUAL: 1 % (ref 0–0)
MONOCYTES # BLD AUTO: 0.24 K/MM3 (ref 0.16–1.47)
MONOCYTES # BLD: 0.12 K/MM3 (ref 0.16–1.47)
MONOCYTES # BLD: 0.31 K/MM3 (ref 0.16–1.47)
MONOCYTES NFR BLD AUTO: 14 % (ref 4–13)
MONOCYTES NFR BLD: 5 % (ref 4–13)
MONOCYTES NFR BLD: 7 % (ref 4–13)
NEUTROPHILS # BLD AUTO: 0.93 K/MM3 (ref 1.96–9.15)
NEUTROPHILS NFR BLD AUTO: 54 % (ref 41–73)
NEUTS BAND NFR BLD MANUAL: 1 % (ref 0–8)
NEUTS BAND NFR BLD MANUAL: 19 % (ref 0–8)
NEUTS SEG # BLD MANUAL: 1.08 K/MM3 (ref 1.96–9.15)
NEUTS SEG # BLD MANUAL: 4.9 K/MM3 (ref 1.96–9.15)
NEUTS SEG NFR BLD MANUAL: 59 % (ref 41–73)
NEUTS SEG NFR BLD MANUAL: 62 % (ref 41–73)
NRBC # BLD AUTO: 0.04 K/MM3 (ref 0–0.02)
NRBC # BLD AUTO: 0.06 K/MM3 (ref 0–0.02)
NRBC BLD AUTO-RTO: 1 /100 WBC (ref 0–0.2)
NRBC BLD AUTO-RTO: 2.3 /100 WBC (ref 0–0.2)
PLATELET # BLD AUTO: 11 K/MM3 (ref 150–400)
PLATELET # BLD AUTO: 9 K/MM3 (ref 150–400)
POTASSIUM SERPL-SCNC: 3.7 MMOL/L (ref 3.5–5.5)
PROT SERPL-MCNC: 4.6 G/DL (ref 6.4–8.2)
PROT UR STRIP-MCNC: (no result) MG/DL
RBC #/AREA URNS HPF: (no result) /HPF (ref 0–2)
SODIUM SERPL-SCNC: 142 MMOL/L (ref 136–145)
SP GR SPEC: 1.01 (ref 1–1.02)
TOTAL CELLS COUNTED BLD: 100
TOTAL CELLS COUNTED BLD: 100
UROBILINOGEN UR STRIP-MCNC: (no result) MG/DL
WBC #/AREA URNS HPF: (no result) /HPF (ref 0–5)

## 2021-07-07 ENCOUNTER — HOSPITAL ENCOUNTER (OUTPATIENT)
Dept: HOSPITAL 95 - ATC | Age: 36
Discharge: HOME | End: 2021-07-07
Attending: INTERNAL MEDICINE
Payer: COMMERCIAL

## 2021-07-07 DIAGNOSIS — C91.02: Primary | ICD-10-CM

## 2021-07-07 DIAGNOSIS — K21.9: ICD-10-CM

## 2021-07-07 DIAGNOSIS — D64.81: ICD-10-CM

## 2021-07-07 DIAGNOSIS — Z79.899: ICD-10-CM

## 2021-07-07 PROCEDURE — P9016 RBC LEUKOCYTES REDUCED: HCPCS

## 2021-07-07 PROCEDURE — P9035 PLATELET PHERES LEUKOREDUCED: HCPCS

## 2021-07-07 PROCEDURE — A9270 NON-COVERED ITEM OR SERVICE: HCPCS

## 2021-07-08 LAB
ALBUMIN SERPL BCP-MCNC: 2.5 G/DL (ref 3.4–5)
ALBUMIN/GLOB SERPL: 1.1 {RATIO} (ref 0.8–1.8)
ALT SERPL W P-5'-P-CCNC: 21 U/L (ref 12–78)
ANION GAP SERPL CALCULATED.4IONS-SCNC: 4 MMOL/L (ref 6–16)
AST SERPL W P-5'-P-CCNC: 21 U/L (ref 12–37)
BASOPHILS # BLD: 0 K/MM3 (ref 0–0.23)
BASOPHILS NFR BLD: 0 % (ref 0–2)
BILIRUB SERPL-MCNC: 0.4 MG/DL (ref 0.1–1)
BUN SERPL-MCNC: 9 MG/DL (ref 8–24)
CALCIUM SERPL-MCNC: 8.6 MG/DL (ref 8.5–10.1)
CHLORIDE SERPL-SCNC: 108 MMOL/L (ref 98–108)
CO2 SERPL-SCNC: 31 MMOL/L (ref 21–32)
CREAT SERPL-MCNC: 0.65 MG/DL (ref 0.4–1)
DEPRECATED RDW RBC AUTO: 51.8 FL (ref 35.1–46.3)
EOSINOPHIL # BLD: 0 K/MM3 (ref 0–0.68)
EOSINOPHIL NFR BLD: 0 % (ref 0–6)
ERYTHROCYTE [DISTWIDTH] IN BLOOD BY AUTOMATED COUNT: 24 % (ref 11.7–14.2)
GLOBULIN SER CALC-MCNC: 2.3 G/DL (ref 2.2–4)
GLUCOSE SERPL-MCNC: 89 MG/DL (ref 70–99)
HCT VFR BLD AUTO: 23.2 % (ref 33–51)
HGB BLD-MCNC: 7.6 G/DL (ref 11.5–16)
LDH SERPL-CCNC: 181 U/L (ref 100–240)
LYMPHOCYTES # BLD: 0.38 K/MM3 (ref 0.84–5.2)
LYMPHOCYTES NFR BLD: 6 % (ref 21–46)
MCHC RBC AUTO-ENTMCNC: 32.8 G/DL (ref 31.5–36.5)
MCV RBC AUTO: 92 FL (ref 80–100)
MONOCYTES # BLD: 0.16 K/MM3 (ref 0.16–1.47)
MONOCYTES NFR BLD: 3 % (ref 4–13)
NEUTS BAND NFR BLD MANUAL: 4 % (ref 0–8)
NEUTS SEG # BLD MANUAL: 4.88 K/MM3 (ref 1.96–9.15)
NEUTS SEG NFR BLD MANUAL: 86 % (ref 41–73)
NRBC # BLD AUTO: 0.03 K/MM3 (ref 0–0.02)
NRBC BLD AUTO-RTO: 0.6 /100 WBC (ref 0–0.2)
PLATELET # BLD AUTO: 29 K/MM3 (ref 150–400)
POTASSIUM SERPL-SCNC: 3.5 MMOL/L (ref 3.5–5.5)
PROT SERPL-MCNC: 4.8 G/DL (ref 6.4–8.2)
SODIUM SERPL-SCNC: 143 MMOL/L (ref 136–145)
TOTAL CELLS COUNTED BLD: 100
VARIANT LYMPHS NFR BLD MANUAL: 1 % (ref 0–0)

## 2021-07-10 ENCOUNTER — HOSPITAL ENCOUNTER (OUTPATIENT)
Dept: HOSPITAL 95 - ATC | Age: 36
Discharge: HOME | End: 2021-07-10
Attending: INTERNAL MEDICINE
Payer: COMMERCIAL

## 2021-07-10 DIAGNOSIS — C92.02: Primary | ICD-10-CM

## 2021-07-10 DIAGNOSIS — C91.02: ICD-10-CM

## 2021-07-10 DIAGNOSIS — D61.818: ICD-10-CM

## 2021-07-10 PROCEDURE — A9270 NON-COVERED ITEM OR SERVICE: HCPCS

## 2021-07-10 PROCEDURE — P9040 RBC LEUKOREDUCED IRRADIATED: HCPCS

## 2021-07-12 LAB
BASOPHILS # BLD: 0 K/MM3 (ref 0–0.23)
BASOPHILS NFR BLD: 0 % (ref 0–2)
DEPRECATED RDW RBC AUTO: 52.5 FL (ref 35.1–46.3)
EOSINOPHIL # BLD: 0.03 K/MM3 (ref 0–0.68)
EOSINOPHIL NFR BLD: 2 % (ref 0–6)
ERYTHROCYTE [DISTWIDTH] IN BLOOD BY AUTOMATED COUNT: 23.6 % (ref 11.7–14.2)
HCT VFR BLD AUTO: 31.4 % (ref 33–51)
HGB BLD-MCNC: 10.1 G/DL (ref 11.5–16)
LYMPHOCYTES # BLD: 0.42 K/MM3 (ref 0.84–5.2)
LYMPHOCYTES NFR BLD: 24 % (ref 21–46)
MCHC RBC AUTO-ENTMCNC: 32.2 G/DL (ref 31.5–36.5)
MCV RBC AUTO: 93 FL (ref 80–100)
MONOCYTES # BLD: 0.17 K/MM3 (ref 0.16–1.47)
MONOCYTES NFR BLD: 10 % (ref 4–13)
NEUTS BAND NFR BLD MANUAL: 4 % (ref 0–8)
NEUTS SEG # BLD MANUAL: 1.13 K/MM3 (ref 1.96–9.15)
NEUTS SEG NFR BLD MANUAL: 60 % (ref 41–73)
NRBC # BLD AUTO: 0 K/MM3 (ref 0–0.02)
NRBC BLD AUTO-RTO: 0 /100 WBC (ref 0–0.2)
PLATELET # BLD AUTO: 9 K/MM3 (ref 150–400)
TOTAL CELLS COUNTED BLD: 50

## 2021-07-13 ENCOUNTER — HOSPITAL ENCOUNTER (OUTPATIENT)
Dept: HOSPITAL 95 - ATC | Age: 36
Discharge: HOME | End: 2021-07-13
Payer: COMMERCIAL

## 2021-07-13 DIAGNOSIS — C91.02: Primary | ICD-10-CM

## 2021-07-13 DIAGNOSIS — D61.818: ICD-10-CM

## 2021-07-13 DIAGNOSIS — Z91.048: ICD-10-CM

## 2021-07-13 PROCEDURE — A9270 NON-COVERED ITEM OR SERVICE: HCPCS

## 2021-07-13 PROCEDURE — P9037 PLATE PHERES LEUKOREDU IRRAD: HCPCS

## 2021-07-19 LAB
ALBUMIN SERPL BCP-MCNC: 2.7 G/DL (ref 3.4–5)
ALBUMIN/GLOB SERPL: 1.1 {RATIO} (ref 0.8–1.8)
ALT SERPL W P-5'-P-CCNC: 20 U/L (ref 12–78)
ANION GAP SERPL CALCULATED.4IONS-SCNC: 4 MMOL/L (ref 6–16)
AST SERPL W P-5'-P-CCNC: 28 U/L (ref 12–37)
BASOPHILS # BLD: 0.03 K/MM3 (ref 0–0.23)
BASOPHILS NFR BLD: 4 % (ref 0–2)
BILIRUB SERPL-MCNC: 0.2 MG/DL (ref 0.1–1)
BUN SERPL-MCNC: 12 MG/DL (ref 8–24)
CALCIUM SERPL-MCNC: 8.4 MG/DL (ref 8.5–10.1)
CHLORIDE SERPL-SCNC: 109 MMOL/L (ref 98–108)
CO2 SERPL-SCNC: 30 MMOL/L (ref 21–32)
CREAT SERPL-MCNC: 0.6 MG/DL (ref 0.4–1)
DEPRECATED RDW RBC AUTO: (no result) FL (ref 35.1–46.3)
EOSINOPHIL # BLD: 0 K/MM3 (ref 0–0.68)
EOSINOPHIL NFR BLD: 0 % (ref 0–6)
ERYTHROCYTE [DISTWIDTH] IN BLOOD BY AUTOMATED COUNT: (no result) % (ref 11.7–14.2)
GLOBULIN SER CALC-MCNC: 2.5 G/DL (ref 2.2–4)
GLUCOSE SERPL-MCNC: 94 MG/DL (ref 70–99)
HCT VFR BLD AUTO: 18.3 % (ref 33–51)
HGB BLD-MCNC: 5.6 G/DL (ref 11.5–16)
LDH SERPL-CCNC: 227 U/L (ref 100–240)
LYMPHOCYTES # BLD: 0.48 K/MM3 (ref 0.84–5.2)
LYMPHOCYTES NFR BLD: 60 % (ref 21–46)
MCHC RBC AUTO-ENTMCNC: 30.6 G/DL (ref 31.5–36.5)
MCV RBC AUTO: 105 FL (ref 80–100)
MONOCYTES # BLD: 0 K/MM3 (ref 0.16–1.47)
MONOCYTES NFR BLD: 0 % (ref 4–13)
NEUTS BAND NFR BLD MANUAL: 4 % (ref 0–8)
NEUTS SEG # BLD MANUAL: 0.29 K/MM3 (ref 1.96–9.15)
NEUTS SEG NFR BLD MANUAL: 32 % (ref 41–73)
NRBC # BLD AUTO: 0 K/MM3 (ref 0–0.02)
NRBC BLD AUTO-RTO: 0 /100 WBC (ref 0–0.2)
PLATELET # BLD AUTO: 16 K/MM3 (ref 150–400)
POTASSIUM SERPL-SCNC: 3.8 MMOL/L (ref 3.5–5.5)
PROT SERPL-MCNC: 5.2 G/DL (ref 6.4–8.2)
SODIUM SERPL-SCNC: 143 MMOL/L (ref 136–145)
TOTAL CELLS COUNTED BLD: 25

## 2021-07-21 ENCOUNTER — HOSPITAL ENCOUNTER (OUTPATIENT)
Dept: HOSPITAL 95 - ATC | Age: 36
Discharge: HOME | End: 2021-07-21
Attending: INTERNAL MEDICINE
Payer: COMMERCIAL

## 2021-07-21 DIAGNOSIS — Z94.81: ICD-10-CM

## 2021-07-21 DIAGNOSIS — Z92.21: ICD-10-CM

## 2021-07-21 DIAGNOSIS — C91.02: Primary | ICD-10-CM

## 2021-07-21 DIAGNOSIS — Z91.048: ICD-10-CM

## 2021-07-21 DIAGNOSIS — Z91.09: ICD-10-CM

## 2021-07-21 PROCEDURE — P9040 RBC LEUKOREDUCED IRRADIATED: HCPCS

## 2021-07-21 PROCEDURE — P9037 PLATE PHERES LEUKOREDU IRRAD: HCPCS

## 2021-07-21 PROCEDURE — A9270 NON-COVERED ITEM OR SERVICE: HCPCS

## 2021-07-22 LAB
ALBUMIN SERPL BCP-MCNC: 2.6 G/DL (ref 3.4–5)
ALBUMIN/GLOB SERPL: 1.2 {RATIO} (ref 0.8–1.8)
ALT SERPL W P-5'-P-CCNC: 18 U/L (ref 12–78)
ANION GAP SERPL CALCULATED.4IONS-SCNC: 3 MMOL/L (ref 6–16)
AST SERPL W P-5'-P-CCNC: 21 U/L (ref 12–37)
BILIRUB SERPL-MCNC: 0.4 MG/DL (ref 0.1–1)
BUN SERPL-MCNC: 9 MG/DL (ref 8–24)
CALCIUM SERPL-MCNC: 8.3 MG/DL (ref 8.5–10.1)
CHLORIDE SERPL-SCNC: 108 MMOL/L (ref 98–108)
CO2 SERPL-SCNC: 33 MMOL/L (ref 21–32)
CREAT SERPL-MCNC: 0.59 MG/DL (ref 0.4–1)
DEPRECATED RDW RBC AUTO: (no result) FL (ref 35.1–46.3)
ERYTHROCYTE [DISTWIDTH] IN BLOOD BY AUTOMATED COUNT: (no result) % (ref 11.7–14.2)
GLOBULIN SER CALC-MCNC: 2.2 G/DL (ref 2.2–4)
GLUCOSE SERPL-MCNC: 89 MG/DL (ref 70–99)
HCT VFR BLD AUTO: 20.6 % (ref 33–51)
HGB BLD-MCNC: 6.7 G/DL (ref 11.5–16)
LDH SERPL-CCNC: 191 U/L (ref 100–240)
MCHC RBC AUTO-ENTMCNC: 32.5 G/DL (ref 31.5–36.5)
MCV RBC AUTO: 95 FL (ref 80–100)
NRBC # BLD AUTO: 0.02 K/MM3 (ref 0–0.02)
NRBC BLD AUTO-RTO: 0.6 /100 WBC (ref 0–0.2)
PLATELET # BLD AUTO: 34 K/MM3 (ref 150–400)
POTASSIUM SERPL-SCNC: 3.7 MMOL/L (ref 3.5–5.5)
PROT SERPL-MCNC: 4.8 G/DL (ref 6.4–8.2)
SODIUM SERPL-SCNC: 144 MMOL/L (ref 136–145)

## 2021-07-23 ENCOUNTER — HOSPITAL ENCOUNTER (OUTPATIENT)
Dept: HOSPITAL 95 - ATC | Age: 36
Discharge: HOME | End: 2021-07-23
Attending: INTERNAL MEDICINE
Payer: COMMERCIAL

## 2021-07-23 DIAGNOSIS — C91.02: Primary | ICD-10-CM

## 2021-07-23 DIAGNOSIS — K21.9: ICD-10-CM

## 2021-07-23 DIAGNOSIS — D64.81: ICD-10-CM

## 2021-07-23 DIAGNOSIS — T45.1X5A: ICD-10-CM

## 2021-07-23 PROCEDURE — P9040 RBC LEUKOREDUCED IRRADIATED: HCPCS

## 2021-07-23 PROCEDURE — A9270 NON-COVERED ITEM OR SERVICE: HCPCS

## 2021-07-29 ENCOUNTER — HOSPITAL ENCOUNTER (OUTPATIENT)
Dept: HOSPITAL 95 - ATC | Age: 36
Discharge: HOME | End: 2021-07-29
Attending: INTERNAL MEDICINE
Payer: COMMERCIAL

## 2021-07-29 DIAGNOSIS — Z91.048: ICD-10-CM

## 2021-07-29 DIAGNOSIS — C91.02: Primary | ICD-10-CM

## 2021-07-29 DIAGNOSIS — Z88.8: ICD-10-CM

## 2021-07-29 DIAGNOSIS — D61.818: ICD-10-CM

## 2021-07-29 LAB
ALBUMIN SERPL BCP-MCNC: 2.7 G/DL (ref 3.4–5)
ALBUMIN/GLOB SERPL: 1.2 {RATIO} (ref 0.8–1.8)
ALT SERPL W P-5'-P-CCNC: 22 U/L (ref 12–78)
ANION GAP SERPL CALCULATED.4IONS-SCNC: 3 MMOL/L (ref 6–16)
AST SERPL W P-5'-P-CCNC: 25 U/L (ref 12–37)
BASOPHILS # BLD AUTO: 0.01 K/MM3 (ref 0–0.23)
BASOPHILS NFR BLD AUTO: 0 % (ref 0–2)
BILIRUB SERPL-MCNC: 0.3 MG/DL (ref 0.1–1)
BUN SERPL-MCNC: 11 MG/DL (ref 8–24)
CALCIUM SERPL-MCNC: 8.4 MG/DL (ref 8.5–10.1)
CHLORIDE SERPL-SCNC: 107 MMOL/L (ref 98–108)
CO2 SERPL-SCNC: 32 MMOL/L (ref 21–32)
CREAT SERPL-MCNC: 0.59 MG/DL (ref 0.4–1)
DEPRECATED RDW RBC AUTO: 51.4 FL (ref 35.1–46.3)
EOSINOPHIL # BLD AUTO: 0.04 K/MM3 (ref 0–0.68)
EOSINOPHIL NFR BLD AUTO: 1 % (ref 0–6)
ERYTHROCYTE [DISTWIDTH] IN BLOOD BY AUTOMATED COUNT: 23.2 % (ref 11.7–14.2)
GLOBULIN SER CALC-MCNC: 2.2 G/DL (ref 2.2–4)
GLUCOSE SERPL-MCNC: 94 MG/DL (ref 70–99)
HCT VFR BLD AUTO: 24.8 % (ref 33–51)
HGB BLD-MCNC: 8 G/DL (ref 11.5–16)
IMM GRANULOCYTES # BLD AUTO: 0.05 K/MM3 (ref 0–0.1)
IMM GRANULOCYTES NFR BLD AUTO: 2 % (ref 0–1)
LDH SERPL-CCNC: 215 U/L (ref 100–240)
LYMPHOCYTES # BLD AUTO: 0.58 K/MM3 (ref 0.84–5.2)
LYMPHOCYTES NFR BLD AUTO: 17 % (ref 21–46)
MCHC RBC AUTO-ENTMCNC: 32.3 G/DL (ref 31.5–36.5)
MCV RBC AUTO: 97 FL (ref 80–100)
MONOCYTES # BLD AUTO: 0.24 K/MM3 (ref 0.16–1.47)
MONOCYTES NFR BLD AUTO: 7 % (ref 4–13)
NEUTROPHILS # BLD AUTO: 2.42 K/MM3 (ref 1.96–9.15)
NEUTROPHILS NFR BLD AUTO: 72 % (ref 41–73)
NRBC # BLD AUTO: 0 K/MM3 (ref 0–0.02)
NRBC BLD AUTO-RTO: 0 /100 WBC (ref 0–0.2)
PLATELET # BLD AUTO: 21 K/MM3 (ref 150–400)
POTASSIUM SERPL-SCNC: 3.5 MMOL/L (ref 3.5–5.5)
PROT SERPL-MCNC: 4.9 G/DL (ref 6.4–8.2)
SODIUM SERPL-SCNC: 142 MMOL/L (ref 136–145)

## 2021-08-02 LAB
ALBUMIN SERPL BCP-MCNC: 2.3 G/DL (ref 3.4–5)
ALBUMIN/GLOB SERPL: 0.9 {RATIO} (ref 0.8–1.8)
ALT SERPL W P-5'-P-CCNC: 22 U/L (ref 12–78)
ANION GAP SERPL CALCULATED.4IONS-SCNC: 4 MMOL/L (ref 6–16)
AST SERPL W P-5'-P-CCNC: 21 U/L (ref 12–37)
BASOPHILS # BLD: 0.03 K/MM3 (ref 0–0.23)
BASOPHILS NFR BLD: 2 % (ref 0–2)
BILIRUB SERPL-MCNC: 0.3 MG/DL (ref 0.1–1)
BUN SERPL-MCNC: 15 MG/DL (ref 8–24)
CALCIUM SERPL-MCNC: 8 MG/DL (ref 8.5–10.1)
CHLORIDE SERPL-SCNC: 107 MMOL/L (ref 98–108)
CO2 SERPL-SCNC: 31 MMOL/L (ref 21–32)
CREAT SERPL-MCNC: 0.61 MG/DL (ref 0.4–1)
DEPRECATED RDW RBC AUTO: (no result) FL (ref 35.1–46.3)
EOSINOPHIL # BLD: 0 K/MM3 (ref 0–0.68)
EOSINOPHIL NFR BLD: 0 % (ref 0–6)
ERYTHROCYTE [DISTWIDTH] IN BLOOD BY AUTOMATED COUNT: (no result) % (ref 11.7–14.2)
GLOBULIN SER CALC-MCNC: 2.6 G/DL (ref 2.2–4)
GLUCOSE SERPL-MCNC: 99 MG/DL (ref 70–99)
HCT VFR BLD AUTO: 16.5 % (ref 33–51)
HGB BLD-MCNC: 5.3 G/DL (ref 11.5–16)
LDH SERPL-CCNC: 187 U/L (ref 100–240)
LYMPHOCYTES # BLD: 0.4 K/MM3 (ref 0.84–5.2)
LYMPHOCYTES NFR BLD: 24 % (ref 21–46)
MCHC RBC AUTO-ENTMCNC: 32.1 G/DL (ref 31.5–36.5)
MCV RBC AUTO: 103 FL (ref 80–100)
MONOCYTES # BLD: 0.2 K/MM3 (ref 0.16–1.47)
MONOCYTES NFR BLD: 12 % (ref 4–13)
NEUTS SEG # BLD MANUAL: 1.05 K/MM3 (ref 1.96–9.15)
NEUTS SEG NFR BLD MANUAL: 62 % (ref 41–73)
NRBC # BLD AUTO: 0.02 K/MM3 (ref 0–0.02)
NRBC BLD AUTO-RTO: 1.2 /100 WBC (ref 0–0.2)
PLATELET # BLD AUTO: 15 K/MM3 (ref 150–400)
POTASSIUM SERPL-SCNC: 3.5 MMOL/L (ref 3.5–5.5)
PROT SERPL-MCNC: 4.9 G/DL (ref 6.4–8.2)
SODIUM SERPL-SCNC: 142 MMOL/L (ref 136–145)
TOTAL CELLS COUNTED BLD: 50

## 2021-08-03 ENCOUNTER — HOSPITAL ENCOUNTER (OUTPATIENT)
Dept: HOSPITAL 95 - ATC | Age: 36
Discharge: HOME | End: 2021-08-03
Attending: INTERNAL MEDICINE
Payer: COMMERCIAL

## 2021-08-03 DIAGNOSIS — Z91.048: ICD-10-CM

## 2021-08-03 DIAGNOSIS — D61.818: ICD-10-CM

## 2021-08-03 DIAGNOSIS — Z88.8: ICD-10-CM

## 2021-08-03 DIAGNOSIS — C91.02: Primary | ICD-10-CM

## 2021-08-03 DIAGNOSIS — Z94.81: ICD-10-CM

## 2021-08-03 PROCEDURE — P9037 PLATE PHERES LEUKOREDU IRRAD: HCPCS

## 2021-08-03 PROCEDURE — A9270 NON-COVERED ITEM OR SERVICE: HCPCS

## 2021-08-03 PROCEDURE — P9040 RBC LEUKOREDUCED IRRADIATED: HCPCS

## 2021-08-05 LAB
ALBUMIN SERPL BCP-MCNC: 2.6 G/DL (ref 3.4–5)
ALBUMIN/GLOB SERPL: 1 {RATIO} (ref 0.8–1.8)
ALT SERPL W P-5'-P-CCNC: 21 U/L (ref 12–78)
ANION GAP SERPL CALCULATED.4IONS-SCNC: 3 MMOL/L (ref 6–16)
AST SERPL W P-5'-P-CCNC: 25 U/L (ref 12–37)
BASOPHILS # BLD: 0 K/MM3 (ref 0–0.23)
BASOPHILS NFR BLD: 0 % (ref 0–2)
BILIRUB SERPL-MCNC: 0.3 MG/DL (ref 0.1–1)
BUN SERPL-MCNC: 9 MG/DL (ref 8–24)
CALCIUM SERPL-MCNC: 8.2 MG/DL (ref 8.5–10.1)
CHLORIDE SERPL-SCNC: 109 MMOL/L (ref 98–108)
CO2 SERPL-SCNC: 32 MMOL/L (ref 21–32)
CREAT SERPL-MCNC: 0.64 MG/DL (ref 0.4–1)
DEPRECATED RDW RBC AUTO: 51.1 FL (ref 35.1–46.3)
EOSINOPHIL # BLD: 0 K/MM3 (ref 0–0.68)
EOSINOPHIL NFR BLD: 0 % (ref 0–6)
ERYTHROCYTE [DISTWIDTH] IN BLOOD BY AUTOMATED COUNT: 25.9 % (ref 11.7–14.2)
GLOBULIN SER CALC-MCNC: 2.7 G/DL (ref 2.2–4)
GLUCOSE SERPL-MCNC: 106 MG/DL (ref 70–99)
HCT VFR BLD AUTO: 22.1 % (ref 33–51)
HGB BLD-MCNC: 7.2 G/DL (ref 11.5–16)
LYMPHOCYTES # BLD: 0.28 K/MM3 (ref 0.84–5.2)
LYMPHOCYTES NFR BLD: 10 % (ref 21–46)
MCHC RBC AUTO-ENTMCNC: 32.6 G/DL (ref 31.5–36.5)
MCV RBC AUTO: 98 FL (ref 80–100)
MONOCYTES # BLD: 0.19 K/MM3 (ref 0.16–1.47)
MONOCYTES NFR BLD: 7 % (ref 4–13)
NEUTS BAND NFR BLD MANUAL: 1 % (ref 0–8)
NEUTS SEG # BLD MANUAL: 2.32 K/MM3 (ref 1.96–9.15)
NEUTS SEG NFR BLD MANUAL: 82 % (ref 41–73)
NRBC # BLD AUTO: 0.02 K/MM3 (ref 0–0.02)
NRBC BLD AUTO-RTO: 0.7 /100 WBC (ref 0–0.2)
PLATELET # BLD AUTO: 24 K/MM3 (ref 150–400)
POTASSIUM SERPL-SCNC: 3.5 MMOL/L (ref 3.5–5.5)
PROT SERPL-MCNC: 5.3 G/DL (ref 6.4–8.2)
SODIUM SERPL-SCNC: 144 MMOL/L (ref 136–145)
TOTAL CELLS COUNTED BLD: 100

## 2021-08-06 ENCOUNTER — HOSPITAL ENCOUNTER (OUTPATIENT)
Dept: HOSPITAL 95 - ATC | Age: 36
Discharge: HOME | End: 2021-08-06
Attending: INTERNAL MEDICINE
Payer: COMMERCIAL

## 2021-08-06 DIAGNOSIS — D64.9: Primary | ICD-10-CM

## 2021-08-06 DIAGNOSIS — C91.02: ICD-10-CM

## 2021-08-06 PROCEDURE — P9040 RBC LEUKOREDUCED IRRADIATED: HCPCS

## 2021-08-06 PROCEDURE — A9270 NON-COVERED ITEM OR SERVICE: HCPCS

## 2021-08-09 ENCOUNTER — HOSPITAL ENCOUNTER (OUTPATIENT)
Dept: HOSPITAL 95 - ATC | Age: 36
Discharge: HOME | End: 2021-08-09
Attending: INTERNAL MEDICINE
Payer: COMMERCIAL

## 2021-08-09 DIAGNOSIS — Z91.048: ICD-10-CM

## 2021-08-09 DIAGNOSIS — D64.9: Primary | ICD-10-CM

## 2021-08-09 DIAGNOSIS — C91.02: ICD-10-CM

## 2021-08-09 LAB
ALBUMIN SERPL BCP-MCNC: 2.5 G/DL (ref 3.4–5)
ALBUMIN/GLOB SERPL: 1.1 {RATIO} (ref 0.8–1.8)
ALT SERPL W P-5'-P-CCNC: 21 U/L (ref 12–78)
ANION GAP SERPL CALCULATED.4IONS-SCNC: 2 MMOL/L (ref 6–16)
AST SERPL W P-5'-P-CCNC: 27 U/L (ref 12–37)
BASOPHILS # BLD: 0.02 K/MM3 (ref 0–0.23)
BASOPHILS NFR BLD: 2 % (ref 0–2)
BILIRUB SERPL-MCNC: 0.3 MG/DL (ref 0.1–1)
BUN SERPL-MCNC: 11 MG/DL (ref 8–24)
CALCIUM SERPL-MCNC: 8.2 MG/DL (ref 8.5–10.1)
CHLORIDE SERPL-SCNC: 109 MMOL/L (ref 98–108)
CO2 SERPL-SCNC: 31 MMOL/L (ref 21–32)
CREAT SERPL-MCNC: 0.58 MG/DL (ref 0.4–1)
DEPRECATED RDW RBC AUTO: (no result) FL (ref 35.1–46.3)
EOSINOPHIL # BLD: 0.05 K/MM3 (ref 0–0.68)
EOSINOPHIL NFR BLD: 4 % (ref 0–6)
ERYTHROCYTE [DISTWIDTH] IN BLOOD BY AUTOMATED COUNT: (no result) % (ref 11.7–14.2)
GLOBULIN SER CALC-MCNC: 2.3 G/DL (ref 2.2–4)
GLUCOSE SERPL-MCNC: 93 MG/DL (ref 70–99)
HCT VFR BLD AUTO: 21.9 % (ref 33–51)
HGB BLD-MCNC: 6.8 G/DL (ref 11.5–16)
LDH SERPL-CCNC: 217 U/L (ref 100–240)
LYMPHOCYTES # BLD: 0.38 K/MM3 (ref 0.84–5.2)
LYMPHOCYTES NFR BLD: 28 % (ref 21–46)
MCHC RBC AUTO-ENTMCNC: 31.1 G/DL (ref 31.5–36.5)
MCV RBC AUTO: 103 FL (ref 80–100)
MONOCYTES # BLD: 0.06 K/MM3 (ref 0.16–1.47)
MONOCYTES NFR BLD: 5 % (ref 4–13)
MYELOCYTES # BLD MANUAL: 0.01 K/MM3 (ref 0–0)
MYELOCYTES NFR BLD MANUAL: 1 % (ref 0–0)
NEUTS SEG # BLD MANUAL: 0.81 K/MM3 (ref 1.96–9.15)
NEUTS SEG NFR BLD MANUAL: 60 % (ref 41–73)
NRBC # BLD AUTO: 0 K/MM3 (ref 0–0.02)
NRBC BLD AUTO-RTO: 0 /100 WBC (ref 0–0.2)
PLATELET # BLD AUTO: 10 K/MM3 (ref 150–400)
POTASSIUM SERPL-SCNC: 3.3 MMOL/L (ref 3.5–5.5)
PROT SERPL-MCNC: 4.8 G/DL (ref 6.4–8.2)
SODIUM SERPL-SCNC: 142 MMOL/L (ref 136–145)
TOTAL CELLS COUNTED BLD: 100

## 2021-08-09 PROCEDURE — P9040 RBC LEUKOREDUCED IRRADIATED: HCPCS

## 2021-08-09 PROCEDURE — A9270 NON-COVERED ITEM OR SERVICE: HCPCS

## 2021-08-09 PROCEDURE — P9037 PLATE PHERES LEUKOREDU IRRAD: HCPCS

## 2021-08-11 LAB
(LD) FRACTION 5: 14 % (ref 4–20)
LDH SERPL L TO P-CCNC: 283 IU/L (ref 119–226)
LDH1 CFR SERPL ELPH: 16 % (ref 17–32)
LDH2 CFR SERPL ELPH: 31 % (ref 25–40)
LDH3 CFR SERPL ELPH: 24 % (ref 17–27)
LDH4 CFR SERPL ELPH: 15 % (ref 5–13)

## 2021-08-12 ENCOUNTER — HOSPITAL ENCOUNTER (OUTPATIENT)
Dept: HOSPITAL 95 - ATC | Age: 36
Discharge: HOME | End: 2021-08-12
Attending: INTERNAL MEDICINE
Payer: COMMERCIAL

## 2021-08-12 DIAGNOSIS — C91.02: ICD-10-CM

## 2021-08-12 DIAGNOSIS — Z94.81: ICD-10-CM

## 2021-08-12 DIAGNOSIS — Z88.8: ICD-10-CM

## 2021-08-12 DIAGNOSIS — K21.9: ICD-10-CM

## 2021-08-12 DIAGNOSIS — D64.9: Primary | ICD-10-CM

## 2021-08-12 DIAGNOSIS — Z92.21: ICD-10-CM

## 2021-08-12 DIAGNOSIS — Z91.048: ICD-10-CM

## 2021-08-12 LAB
ALBUMIN SERPL BCP-MCNC: 2.6 G/DL (ref 3.4–5)
ALBUMIN/GLOB SERPL: 1 {RATIO} (ref 0.8–1.8)
ALT SERPL W P-5'-P-CCNC: 25 U/L (ref 12–78)
ANION GAP SERPL CALCULATED.4IONS-SCNC: 3 MMOL/L (ref 6–16)
AST SERPL W P-5'-P-CCNC: 24 U/L (ref 12–37)
BASOPHILS # BLD AUTO: 0.01 K/MM3 (ref 0–0.23)
BASOPHILS NFR BLD AUTO: 1 % (ref 0–2)
BILIRUB SERPL-MCNC: 0.4 MG/DL (ref 0.1–1)
BUN SERPL-MCNC: 13 MG/DL (ref 8–24)
CALCIUM SERPL-MCNC: 8.6 MG/DL (ref 8.5–10.1)
CHLORIDE SERPL-SCNC: 109 MMOL/L (ref 98–108)
CO2 SERPL-SCNC: 31 MMOL/L (ref 21–32)
CREAT SERPL-MCNC: 0.61 MG/DL (ref 0.4–1)
DEPRECATED RDW RBC AUTO: (no result) FL (ref 35.1–46.3)
EOSINOPHIL # BLD AUTO: 0.03 K/MM3 (ref 0–0.68)
EOSINOPHIL NFR BLD AUTO: 2 % (ref 0–6)
ERYTHROCYTE [DISTWIDTH] IN BLOOD BY AUTOMATED COUNT: (no result) % (ref 11.7–14.2)
GLOBULIN SER CALC-MCNC: 2.5 G/DL (ref 2.2–4)
GLUCOSE SERPL-MCNC: 89 MG/DL (ref 70–99)
HCT VFR BLD AUTO: 22.5 % (ref 33–51)
HGB BLD-MCNC: 7.2 G/DL (ref 11.5–16)
IMM GRANULOCYTES # BLD AUTO: 0.04 K/MM3 (ref 0–0.1)
IMM GRANULOCYTES NFR BLD AUTO: 2 % (ref 0–1)
LYMPHOCYTES # BLD AUTO: 0.44 K/MM3 (ref 0.84–5.2)
LYMPHOCYTES NFR BLD AUTO: 27 % (ref 21–46)
MCHC RBC AUTO-ENTMCNC: 32 G/DL (ref 31.5–36.5)
MCV RBC AUTO: 103 FL (ref 80–100)
MONOCYTES # BLD AUTO: 0.17 K/MM3 (ref 0.16–1.47)
MONOCYTES NFR BLD AUTO: 10 % (ref 4–13)
NEUTROPHILS # BLD AUTO: 0.95 K/MM3 (ref 1.96–9.15)
NEUTROPHILS NFR BLD AUTO: 58 % (ref 41–73)
NRBC # BLD AUTO: 0 K/MM3 (ref 0–0.02)
NRBC BLD AUTO-RTO: 0 /100 WBC (ref 0–0.2)
PLATELET # BLD AUTO: 21 K/MM3 (ref 150–400)
POTASSIUM SERPL-SCNC: 3.6 MMOL/L (ref 3.5–5.5)
PROT SERPL-MCNC: 5.1 G/DL (ref 6.4–8.2)
SODIUM SERPL-SCNC: 143 MMOL/L (ref 136–145)

## 2021-08-13 ENCOUNTER — HOSPITAL ENCOUNTER (OUTPATIENT)
Dept: HOSPITAL 95 - ATC | Age: 36
Discharge: HOME | End: 2021-08-13
Attending: INTERNAL MEDICINE
Payer: COMMERCIAL

## 2021-08-13 DIAGNOSIS — D64.9: Primary | ICD-10-CM

## 2021-08-13 DIAGNOSIS — Z94.81: ICD-10-CM

## 2021-08-13 DIAGNOSIS — Z92.21: ICD-10-CM

## 2021-08-13 DIAGNOSIS — K21.9: ICD-10-CM

## 2021-08-13 DIAGNOSIS — C91.02: ICD-10-CM

## 2021-08-13 DIAGNOSIS — Z91.048: ICD-10-CM

## 2021-08-13 DIAGNOSIS — Z88.8: ICD-10-CM

## 2021-08-13 PROCEDURE — A9270 NON-COVERED ITEM OR SERVICE: HCPCS

## 2021-08-13 PROCEDURE — P9040 RBC LEUKOREDUCED IRRADIATED: HCPCS

## 2021-08-16 ENCOUNTER — HOSPITAL ENCOUNTER (OUTPATIENT)
Dept: HOSPITAL 95 - ATC | Age: 36
Discharge: HOME | End: 2021-08-16
Attending: INTERNAL MEDICINE
Payer: COMMERCIAL

## 2021-08-16 DIAGNOSIS — Z91.048: ICD-10-CM

## 2021-08-16 DIAGNOSIS — D64.81: Primary | ICD-10-CM

## 2021-08-16 DIAGNOSIS — Z94.81: ICD-10-CM

## 2021-08-16 DIAGNOSIS — Z92.21: ICD-10-CM

## 2021-08-16 DIAGNOSIS — Z88.8: ICD-10-CM

## 2021-08-16 DIAGNOSIS — K21.9: ICD-10-CM

## 2021-08-16 DIAGNOSIS — C91.02: ICD-10-CM

## 2021-08-16 LAB
ALBUMIN SERPL BCP-MCNC: 2.8 G/DL (ref 3.4–5)
ALBUMIN/GLOB SERPL: 1.2 {RATIO} (ref 0.8–1.8)
ALT SERPL W P-5'-P-CCNC: 26 U/L (ref 12–78)
ANION GAP SERPL CALCULATED.4IONS-SCNC: 5 MMOL/L (ref 6–16)
AST SERPL W P-5'-P-CCNC: 23 U/L (ref 12–37)
BASOPHILS # BLD: 0.02 K/MM3 (ref 0–0.23)
BASOPHILS NFR BLD: 1 % (ref 0–2)
BILIRUB SERPL-MCNC: 0.2 MG/DL (ref 0.1–1)
BUN SERPL-MCNC: 16 MG/DL (ref 8–24)
CALCIUM SERPL-MCNC: 7.9 MG/DL (ref 8.5–10.1)
CHLORIDE SERPL-SCNC: 105 MMOL/L (ref 98–108)
CO2 SERPL-SCNC: 31 MMOL/L (ref 21–32)
CREAT SERPL-MCNC: 0.65 MG/DL (ref 0.4–1)
DEPRECATED RDW RBC AUTO: (no result) FL (ref 35.1–46.3)
EOSINOPHIL # BLD: 0.08 K/MM3 (ref 0–0.68)
EOSINOPHIL NFR BLD: 4 % (ref 0–6)
ERYTHROCYTE [DISTWIDTH] IN BLOOD BY AUTOMATED COUNT: (no result) % (ref 11.7–14.2)
GLOBULIN SER CALC-MCNC: 2.3 G/DL (ref 2.2–4)
GLUCOSE SERPL-MCNC: 97 MG/DL (ref 70–99)
HCT VFR BLD AUTO: 20.7 % (ref 33–51)
HGB BLD-MCNC: 6.5 G/DL (ref 11.5–16)
LDH SERPL-CCNC: 186 U/L (ref 100–240)
LYMPHOCYTES # BLD: 0.72 K/MM3 (ref 0.84–5.2)
LYMPHOCYTES NFR BLD: 33 % (ref 21–46)
MCHC RBC AUTO-ENTMCNC: 31.4 G/DL (ref 31.5–36.5)
MCV RBC AUTO: 104 FL (ref 80–100)
MONOCYTES # BLD: 0.11 K/MM3 (ref 0.16–1.47)
MONOCYTES NFR BLD: 5 % (ref 4–13)
NEUTS SEG # BLD MANUAL: 1.25 K/MM3 (ref 1.96–9.15)
NEUTS SEG NFR BLD MANUAL: 57 % (ref 41–73)
NRBC # BLD AUTO: 0 K/MM3 (ref 0–0.02)
NRBC BLD AUTO-RTO: 0 /100 WBC (ref 0–0.2)
PLATELET # BLD AUTO: 23 K/MM3 (ref 150–400)
POTASSIUM SERPL-SCNC: 3.3 MMOL/L (ref 3.5–5.5)
PROT SERPL-MCNC: 5.1 G/DL (ref 6.4–8.2)
SODIUM SERPL-SCNC: 141 MMOL/L (ref 136–145)
TOTAL CELLS COUNTED BLD: 100

## 2021-08-16 PROCEDURE — A9270 NON-COVERED ITEM OR SERVICE: HCPCS

## 2021-08-16 PROCEDURE — P9040 RBC LEUKOREDUCED IRRADIATED: HCPCS

## 2021-08-17 LAB
(LD) FRACTION 5: 10 % (ref 4–20)
LDH SERPL L TO P-CCNC: 228 IU/L (ref 119–226)
LDH1 CFR SERPL ELPH: 22 % (ref 17–32)
LDH2 CFR SERPL ELPH: 35 % (ref 25–40)
LDH3 CFR SERPL ELPH: 22 % (ref 17–27)
LDH4 CFR SERPL ELPH: 11 % (ref 5–13)

## 2021-08-23 NOTE — NUR
PT WILL NOT BE IN TODAY, SHE IS AN INPATIENT AT [facility] PER HER MD'S OFFICE.

## 2021-08-31 LAB
ALBUMIN SERPL BCP-MCNC: 2.4 G/DL (ref 3.4–5)
ALBUMIN/GLOB SERPL: 1.1 {RATIO} (ref 0.8–1.8)
ALT SERPL W P-5'-P-CCNC: 25 U/L (ref 12–78)
ANION GAP SERPL CALCULATED.4IONS-SCNC: 3 MMOL/L (ref 6–16)
AST SERPL W P-5'-P-CCNC: 20 U/L (ref 12–37)
BASOPHILS # BLD AUTO: 0.01 K/MM3 (ref 0–0.23)
BASOPHILS NFR BLD AUTO: 0 % (ref 0–2)
BILIRUB SERPL-MCNC: 0.3 MG/DL (ref 0.1–1)
BUN SERPL-MCNC: 13 MG/DL (ref 8–24)
CALCIUM SERPL-MCNC: 8 MG/DL (ref 8.5–10.1)
CHLORIDE SERPL-SCNC: 105 MMOL/L (ref 98–108)
CO2 SERPL-SCNC: 35 MMOL/L (ref 21–32)
CREAT SERPL-MCNC: 0.6 MG/DL (ref 0.4–1)
DEPRECATED RDW RBC AUTO: 82.3 FL (ref 35.1–46.3)
EOSINOPHIL # BLD AUTO: 0.03 K/MM3 (ref 0–0.68)
EOSINOPHIL NFR BLD AUTO: 1 % (ref 0–6)
ERYTHROCYTE [DISTWIDTH] IN BLOOD BY AUTOMATED COUNT: 22.5 % (ref 11.7–14.2)
GLOBULIN SER CALC-MCNC: 2.2 G/DL (ref 2.2–4)
GLUCOSE SERPL-MCNC: 128 MG/DL (ref 70–99)
HCT VFR BLD AUTO: 23 % (ref 33–51)
HGB BLD-MCNC: 7.4 G/DL (ref 11.5–16)
IMM GRANULOCYTES # BLD AUTO: 0.01 K/MM3 (ref 0–0.1)
IMM GRANULOCYTES NFR BLD AUTO: 0 % (ref 0–1)
LDH SERPL-CCNC: 157 U/L (ref 100–240)
LYMPHOCYTES # BLD AUTO: 0.31 K/MM3 (ref 0.84–5.2)
LYMPHOCYTES NFR BLD AUTO: 12 % (ref 21–46)
MCHC RBC AUTO-ENTMCNC: 32.2 G/DL (ref 31.5–36.5)
MCV RBC AUTO: 103 FL (ref 80–100)
MONOCYTES # BLD AUTO: 0.16 K/MM3 (ref 0.16–1.47)
MONOCYTES NFR BLD AUTO: 6 % (ref 4–13)
NEUTROPHILS # BLD AUTO: 1.97 K/MM3 (ref 1.96–9.15)
NEUTROPHILS NFR BLD AUTO: 79 % (ref 41–73)
NRBC # BLD AUTO: 0 K/MM3 (ref 0–0.02)
NRBC BLD AUTO-RTO: 0 /100 WBC (ref 0–0.2)
PLATELET # BLD AUTO: 20 K/MM3 (ref 150–400)
POTASSIUM SERPL-SCNC: 3.8 MMOL/L (ref 3.5–5.5)
PROT SERPL-MCNC: 4.6 G/DL (ref 6.4–8.2)
SODIUM SERPL-SCNC: 143 MMOL/L (ref 136–145)

## 2021-09-01 ENCOUNTER — HOSPITAL ENCOUNTER (OUTPATIENT)
Dept: HOSPITAL 95 - ATC | Age: 36
Discharge: HOME | End: 2021-09-01
Attending: INTERNAL MEDICINE
Payer: COMMERCIAL

## 2021-09-01 DIAGNOSIS — Z94.81: ICD-10-CM

## 2021-09-01 DIAGNOSIS — D61.818: Primary | ICD-10-CM

## 2021-09-01 DIAGNOSIS — K21.9: ICD-10-CM

## 2021-09-01 DIAGNOSIS — Z85.6: ICD-10-CM

## 2021-09-01 DIAGNOSIS — Z88.8: ICD-10-CM

## 2021-09-01 DIAGNOSIS — Z91.048: ICD-10-CM

## 2021-09-01 PROCEDURE — P9053 PLT, PHER, L/R CMV-NEG, IRR: HCPCS

## 2021-09-01 PROCEDURE — A9270 NON-COVERED ITEM OR SERVICE: HCPCS

## 2021-09-02 LAB
BASOPHILS # BLD: 0 K/MM3 (ref 0–0.23)
BASOPHILS NFR BLD: 0 % (ref 0–2)
DEPRECATED RDW RBC AUTO: 91.7 FL (ref 35.1–46.3)
EOSINOPHIL # BLD: 0.05 K/MM3 (ref 0–0.68)
EOSINOPHIL NFR BLD: 2 % (ref 0–6)
ERYTHROCYTE [DISTWIDTH] IN BLOOD BY AUTOMATED COUNT: 24.6 % (ref 11.7–14.2)
HCT VFR BLD AUTO: 19.2 % (ref 33–51)
HGB BLD-MCNC: 6 G/DL (ref 11.5–16)
LYMPHOCYTES # BLD: 0.33 K/MM3 (ref 0.84–5.2)
LYMPHOCYTES NFR BLD: 12 % (ref 21–46)
MCHC RBC AUTO-ENTMCNC: 31.3 G/DL (ref 31.5–36.5)
MCV RBC AUTO: 108 FL (ref 80–100)
MONOCYTES # BLD: 0.11 K/MM3 (ref 0.16–1.47)
MONOCYTES NFR BLD: 4 % (ref 4–13)
NEUTS BAND NFR BLD MANUAL: 1 % (ref 0–8)
NEUTS SEG # BLD MANUAL: 2.27 K/MM3 (ref 1.96–9.15)
NEUTS SEG NFR BLD MANUAL: 81 % (ref 41–73)
NRBC # BLD AUTO: 0 K/MM3 (ref 0–0.02)
NRBC BLD AUTO-RTO: 0 /100 WBC (ref 0–0.2)
PLATELET # BLD AUTO: 38 K/MM3 (ref 150–400)
TOTAL CELLS COUNTED BLD: 100

## 2021-09-03 LAB
ALBUMIN SERPL BCP-MCNC: 2.6 G/DL (ref 3.4–5)
ALBUMIN/GLOB SERPL: 1 {RATIO} (ref 0.8–1.8)
ALT SERPL W P-5'-P-CCNC: 23 U/L (ref 12–78)
ANION GAP SERPL CALCULATED.4IONS-SCNC: 3 MMOL/L (ref 6–16)
AST SERPL W P-5'-P-CCNC: 22 U/L (ref 12–37)
BILIRUB SERPL-MCNC: 0.3 MG/DL (ref 0.1–1)
BUN SERPL-MCNC: 13 MG/DL (ref 8–24)
CALCIUM SERPL-MCNC: 8.9 MG/DL (ref 8.5–10.1)
CHLORIDE SERPL-SCNC: 103 MMOL/L (ref 98–108)
CO2 SERPL-SCNC: 35 MMOL/L (ref 21–32)
CREAT SERPL-MCNC: 0.64 MG/DL (ref 0.4–1)
GLOBULIN SER CALC-MCNC: 2.6 G/DL (ref 2.2–4)
GLUCOSE SERPL-MCNC: 93 MG/DL (ref 70–99)
LDH SERPL-CCNC: 163 U/L (ref 100–240)
MAGNESIUM SERPL-MCNC: 2.1 MG/DL (ref 1.6–2.4)
PHOSPHATE SERPL-MCNC: 3.8 MG/DL (ref 2.5–4.9)
POTASSIUM SERPL-SCNC: 4 MMOL/L (ref 3.5–5.5)
PROT SERPL-MCNC: 5.2 G/DL (ref 6.4–8.2)
SODIUM SERPL-SCNC: 141 MMOL/L (ref 136–145)
URATE SERPL-MCNC: 5.2 MG/DL (ref 2.6–6)

## 2021-09-04 ENCOUNTER — HOSPITAL ENCOUNTER (OUTPATIENT)
Dept: HOSPITAL 95 - ATC | Age: 36
Discharge: HOME | End: 2021-09-04
Attending: INTERNAL MEDICINE
Payer: COMMERCIAL

## 2021-09-04 DIAGNOSIS — C91.02: Primary | ICD-10-CM

## 2021-09-04 DIAGNOSIS — Z92.21: ICD-10-CM

## 2021-09-04 DIAGNOSIS — K21.9: ICD-10-CM

## 2021-09-04 DIAGNOSIS — Z88.8: ICD-10-CM

## 2021-09-04 DIAGNOSIS — Z94.81: ICD-10-CM

## 2021-09-04 PROCEDURE — A9270 NON-COVERED ITEM OR SERVICE: HCPCS

## 2021-09-04 PROCEDURE — P9040 RBC LEUKOREDUCED IRRADIATED: HCPCS

## 2021-09-04 PROCEDURE — P9053 PLT, PHER, L/R CMV-NEG, IRR: HCPCS

## 2021-09-20 LAB
ALBUMIN SERPL BCP-MCNC: 2.7 G/DL (ref 3.4–5)
ALBUMIN/GLOB SERPL: 1.1 {RATIO} (ref 0.8–1.8)
ALT SERPL W P-5'-P-CCNC: 33 U/L (ref 12–78)
ANION GAP SERPL CALCULATED.4IONS-SCNC: 8 MMOL/L (ref 6–16)
AST SERPL W P-5'-P-CCNC: 21 U/L (ref 12–37)
BASOPHILS # BLD AUTO: 0 K/MM3 (ref 0–0.23)
BASOPHILS NFR BLD AUTO: 0 % (ref 0–2)
BILIRUB SERPL-MCNC: 0.6 MG/DL (ref 0.1–1)
BUN SERPL-MCNC: 13 MG/DL (ref 8–24)
CALCIUM SERPL-MCNC: 7.6 MG/DL (ref 8.5–10.1)
CHLORIDE SERPL-SCNC: 97 MMOL/L (ref 98–108)
CO2 SERPL-SCNC: 31 MMOL/L (ref 21–32)
CREAT SERPL-MCNC: 0.55 MG/DL (ref 0.4–1)
DEPRECATED RDW RBC AUTO: (no result) FL (ref 35.1–46.3)
EOSINOPHIL # BLD AUTO: 0 K/MM3 (ref 0–0.68)
EOSINOPHIL NFR BLD AUTO: 0 % (ref 0–6)
ERYTHROCYTE [DISTWIDTH] IN BLOOD BY AUTOMATED COUNT: (no result) % (ref 11.7–14.2)
GLOBULIN SER CALC-MCNC: 2.4 G/DL (ref 2.2–4)
GLUCOSE SERPL-MCNC: 178 MG/DL (ref 70–99)
HCT VFR BLD AUTO: 14.7 % (ref 33–51)
HGB BLD-MCNC: 4.5 G/DL (ref 11.5–16)
IMM GRANULOCYTES # BLD AUTO: 0.14 K/MM3 (ref 0–0.1)
IMM GRANULOCYTES NFR BLD AUTO: 5 % (ref 0–1)
LYMPHOCYTES # BLD AUTO: 0.4 K/MM3 (ref 0.84–5.2)
LYMPHOCYTES NFR BLD AUTO: 13 % (ref 21–46)
MCHC RBC AUTO-ENTMCNC: 30.6 G/DL (ref 31.5–36.5)
MCV RBC AUTO: 131 FL (ref 80–100)
MONOCYTES # BLD AUTO: 0.09 K/MM3 (ref 0.16–1.47)
MONOCYTES NFR BLD AUTO: 3 % (ref 4–13)
NEUTROPHILS # BLD AUTO: 2.42 K/MM3 (ref 1.96–9.15)
NEUTROPHILS NFR BLD AUTO: 79 % (ref 41–73)
NRBC # BLD AUTO: 0.06 K/MM3 (ref 0–0.02)
NRBC BLD AUTO-RTO: 2 /100 WBC (ref 0–0.2)
PLATELET # BLD AUTO: 46 K/MM3 (ref 150–400)
POTASSIUM SERPL-SCNC: 3.2 MMOL/L (ref 3.5–5.5)
PROT SERPL-MCNC: 5.1 G/DL (ref 6.4–8.2)
SODIUM SERPL-SCNC: 136 MMOL/L (ref 136–145)

## 2021-09-21 ENCOUNTER — HOSPITAL ENCOUNTER (OUTPATIENT)
Dept: HOSPITAL 95 - ATC | Age: 36
Discharge: HOME | End: 2021-09-21
Attending: PHYSICIAN ASSISTANT
Payer: COMMERCIAL

## 2021-09-21 DIAGNOSIS — D64.81: ICD-10-CM

## 2021-09-21 DIAGNOSIS — C91.02: Primary | ICD-10-CM

## 2021-09-21 DIAGNOSIS — Z79.899: ICD-10-CM

## 2021-09-21 DIAGNOSIS — K21.9: ICD-10-CM

## 2021-09-21 PROCEDURE — A9270 NON-COVERED ITEM OR SERVICE: HCPCS

## 2021-09-21 PROCEDURE — P9040 RBC LEUKOREDUCED IRRADIATED: HCPCS

## 2021-09-23 ENCOUNTER — HOSPITAL ENCOUNTER (OUTPATIENT)
Dept: HOSPITAL 95 - ATC | Age: 36
Discharge: HOME | End: 2021-09-23
Attending: INTERNAL MEDICINE
Payer: COMMERCIAL

## 2021-09-23 DIAGNOSIS — Z94.81: ICD-10-CM

## 2021-09-23 DIAGNOSIS — C91.02: Primary | ICD-10-CM

## 2021-09-23 DIAGNOSIS — Z94.84: ICD-10-CM

## 2021-09-23 LAB
ALBUMIN SERPL BCP-MCNC: 2.5 G/DL (ref 3.4–5)
ALBUMIN/GLOB SERPL: 1.1 {RATIO} (ref 0.8–1.8)
ALT SERPL W P-5'-P-CCNC: 33 U/L (ref 12–78)
ANION GAP SERPL CALCULATED.4IONS-SCNC: 4 MMOL/L (ref 6–16)
AST SERPL W P-5'-P-CCNC: 21 U/L (ref 12–37)
BASOPHILS # BLD: 0 K/MM3 (ref 0–0.23)
BASOPHILS NFR BLD: 0 % (ref 0–2)
BILIRUB SERPL-MCNC: 0.6 MG/DL (ref 0.1–1)
BUN SERPL-MCNC: 12 MG/DL (ref 8–24)
CALCIUM SERPL-MCNC: 8.2 MG/DL (ref 8.5–10.1)
CHLORIDE SERPL-SCNC: 97 MMOL/L (ref 98–108)
CO2 SERPL-SCNC: 36 MMOL/L (ref 21–32)
CREAT SERPL-MCNC: 0.49 MG/DL (ref 0.4–1)
DEPRECATED RDW RBC AUTO: (no result) FL (ref 35.1–46.3)
EOSINOPHIL # BLD: 0 K/MM3 (ref 0–0.68)
EOSINOPHIL NFR BLD: 0 % (ref 0–6)
ERYTHROCYTE [DISTWIDTH] IN BLOOD BY AUTOMATED COUNT: (no result) % (ref 11.7–14.2)
GLOBULIN SER CALC-MCNC: 2.2 G/DL (ref 2.2–4)
GLUCOSE SERPL-MCNC: 106 MG/DL (ref 70–99)
HCT VFR BLD AUTO: 20.4 % (ref 33–51)
HGB BLD-MCNC: 6.7 G/DL (ref 11.5–16)
LYMPHOCYTES # BLD: 0.41 K/MM3 (ref 0.84–5.2)
LYMPHOCYTES NFR BLD: 15 % (ref 21–46)
MCHC RBC AUTO-ENTMCNC: 32.8 G/DL (ref 31.5–36.5)
MCV RBC AUTO: 93 FL (ref 80–100)
MONOCYTES # BLD: 0.02 K/MM3 (ref 0.16–1.47)
MONOCYTES NFR BLD: 1 % (ref 4–13)
NEUTS BAND NFR BLD MANUAL: 11 % (ref 0–8)
NEUTS SEG # BLD MANUAL: 2.33 K/MM3 (ref 1.96–9.15)
NEUTS SEG NFR BLD MANUAL: 73 % (ref 41–73)
NRBC # BLD AUTO: 0.05 K/MM3 (ref 0–0.02)
NRBC BLD AUTO-RTO: 1.8 /100 WBC (ref 0–0.2)
PLATELET # BLD AUTO: 33 K/MM3 (ref 150–400)
POTASSIUM SERPL-SCNC: 3.2 MMOL/L (ref 3.5–5.5)
PROT SERPL-MCNC: 4.7 G/DL (ref 6.4–8.2)
SODIUM SERPL-SCNC: 137 MMOL/L (ref 136–145)
TOTAL CELLS COUNTED BLD: 100

## 2024-04-12 NOTE — NUR
Unidentified called and left a voice mail at 338 PM requesting to speak with Meka.     Call returned. Spoke w/spouse: Perla. Informed writer received the Parking Placard document with nurse practioner's signature. How does she get document back to provider to fax.     Chart review completed noted completed Parking Placard in media tab. Pt. Informed the document when complete is returned to the patient/family. Pt./family makes sure it's returned to [place] . There's no fax number on the document only an address for .    Per spouse will take document to the  office.     Routed to notify [facility] clinical support pool.    VERIFYING PAPERWORK SENT TO MEDICAL RECORDS